# Patient Record
Sex: FEMALE | Race: BLACK OR AFRICAN AMERICAN | NOT HISPANIC OR LATINO | Employment: OTHER | URBAN - METROPOLITAN AREA
[De-identification: names, ages, dates, MRNs, and addresses within clinical notes are randomized per-mention and may not be internally consistent; named-entity substitution may affect disease eponyms.]

---

## 2019-10-29 LAB — CRC RECOMMENDATION EXT: NORMAL

## 2021-08-02 LAB — BCS RECOMMENDATION EXT: NORMAL

## 2022-06-07 ENCOUNTER — TELEPHONE (OUTPATIENT)
Dept: HEMATOLOGY/ONCOLOGY | Facility: CLINIC | Age: 57
End: 2022-06-07
Payer: MEDICARE

## 2022-06-07 NOTE — TELEPHONE ENCOUNTER
Patient said she was told to call the office if she ever started getting headaches. She states she has been getting them since Thursday. Tylenol gives her little to no relief last night she states she had to take 4 excedrin to get relief. During our conversation she remembered that she was started on a Prednisone taper dose from her Derm last week and thinks that may be contributing. She will be finished on Friday.    Rx is written for 30mg x 3 days then 20mg x 2 days then 10mg x 2 days  Please advise      [phone]

## 2022-07-08 ENCOUNTER — OFFICE VISIT (OUTPATIENT)
Dept: HEMATOLOGY/ONCOLOGY | Facility: CLINIC | Age: 57
End: 2022-07-08
Payer: MEDICARE

## 2022-07-08 VITALS
OXYGEN SATURATION: 98 % | WEIGHT: 190.38 LBS | DIASTOLIC BLOOD PRESSURE: 93 MMHG | BODY MASS INDEX: 35.03 KG/M2 | SYSTOLIC BLOOD PRESSURE: 151 MMHG | HEIGHT: 62 IN | HEART RATE: 68 BPM | RESPIRATION RATE: 18 BRPM

## 2022-07-08 DIAGNOSIS — D47.2 MONOCLONAL PARAPROTEINEMIA: ICD-10-CM

## 2022-07-08 PROCEDURE — 99214 OFFICE O/P EST MOD 30 MIN: CPT | Mod: ,,, | Performed by: STUDENT IN AN ORGANIZED HEALTH CARE EDUCATION/TRAINING PROGRAM

## 2022-07-08 PROCEDURE — 99214 PR OFFICE/OUTPT VISIT, EST, LEVL IV, 30-39 MIN: ICD-10-PCS | Mod: ,,, | Performed by: STUDENT IN AN ORGANIZED HEALTH CARE EDUCATION/TRAINING PROGRAM

## 2022-07-08 RX ORDER — CLOBETASOL PROPIONATE 0.5 MG/G
CREAM TOPICAL
COMMUNITY

## 2022-07-08 RX ORDER — ASPIRIN 81 MG/1
1 TABLET ORAL DAILY
COMMUNITY

## 2022-07-08 RX ORDER — LATANOPROST 50 UG/ML
SOLUTION/ DROPS OPHTHALMIC
COMMUNITY

## 2022-07-08 RX ORDER — MELOXICAM 15 MG/1
1 TABLET ORAL DAILY PRN
COMMUNITY
Start: 2021-07-19 | End: 2024-02-09

## 2022-07-08 RX ORDER — FOLIC ACID 1 MG/1
1 TABLET ORAL DAILY
COMMUNITY
Start: 2022-03-23

## 2022-07-08 RX ORDER — LEVOTHYROXINE SODIUM 125 UG/1
1 TABLET ORAL DAILY
COMMUNITY

## 2022-07-14 PROBLEM — D47.2 MONOCLONAL PARAPROTEINEMIA: Status: ACTIVE | Noted: 2022-07-14

## 2022-07-14 NOTE — PROGRESS NOTES
Chief complaint: Waldenstrom macroglobulinemia    HPI: 55 y/o F w/ PMHx of SLE, glaucoma, hypothyroidism, HLD, GERD, OA and Waldenstrom Macroglobulinemia previously being followed at [facility] by Dr Massey presented to [facility] to establish care.    She has never had a bone marrow biopsy or a lymph node biopsy. Diagnosed with SLE 17 years ago. Presenting symptoms were neuropathy. Diagnosed with WM , never had treatment    Colonoscopy , as per pt negative, Dr Boland. Follow up in 3 years    Interval History  Today, 2022, patient denies any acute concerns. She reports resolution of her headaches after finishing a prednisone taper.  She denies any new medications since.  She denies any symptoms of fevers, chills, drenching night sweats, decreased appetite or weight loss.    PMHx: unclear cardiac valve issue, SLE, waldenstrom macroglobulinemia, OA, GERD, glaucoma, hypothyroidism, HLD  PSHx: bunionectomy, tubal ligation, cyst removal  Social Hx: no tobacco, social ETOH, no drugs  Family Hx: sister: breast cancer father: prostate cancer  Meds: methotrexate, crestor, prednisone, sulfasalazine, diclofenac, synthroid  Allergies: hydrocodone, phenergan    Imagin21 screening MMG: BIRADS2  20 Screening MMG: BIRADS1  19: CT C/A/P: Slight increase of right axillary lymph nodes, the largest of which measures 8mm. Stable left axillary lymph nodes. Slight decrease in size of bilateral inguinal lymph nodes which remain normal by size criteria. No other significant changes.  2/15/19 CT C/A/P w/ IV contrast: borderline enlarged left axillary LN, slight increase in b/l inguinal LNs  18 CT C/A/P w/ IV contrast: small b/l axillary nodes, small borderline nodes in inguinal/pelvic areas  4/2/15 CT A/P w/ IV contrast: previously seen multiple prominent but non enlarged LNs along path of splenic artery and throughout splenic hilum have decreased in size and number since last exam. Previously seen mildly  enlarged LN in between upper pole of left kidney and spleen measuring at least exam 1.7x1.4cm significantly decreased in size now measuring 0.8x0.7cm    Labs:   06/27/2022:  Creatinine 0.76, albumin 3.6, calcium 9.6, , folic acid 9.2, vitamin B12 538, WBC count 3.57, hemoglobin 13.6, MCV 88.9, platelet count 237, INR 1.1, kappa free light chain to 31.86, lambda free light chain 8.93, kappa to lambda free light chain ratio 25.96, IgG 1529, SPEP/Pippa the with the M spike measuring 0.46 grams/deciliter in the beta region.  There is another M spike in the gamma region.  Collective monoclonal M spike measuring 2.05 grams/deciliter, immunofixation shows a palmar rising IgG type kappa and a palmar rising IgM type kappa biclonal protein.  UPEP is positive for monoclonal free kappa light chain, serum viscosity 1.48,    3/16/22 Cr 0.75, Alb 3.4, TP 8.2, , TSH 0.79, folic acid 12.27, WBC 4.05, Hgb 14.1, , ANC 2.31, PT 12.0, PTT 31, INR 1.0, IgG 1682, IgM 1266, IgA 63, SFLC ratio 28.90, kappa 215.63, lambda 7.46, collect M spike 2.13 g/dL IgG kappa and IgM kappa, UPEP  mg/day, Positive Bence Garcia protein. Urine PIPPA shows IgG heavy chain with associated kappa light chain excess monoclonal free kappa light chains, serum viscosity 1.46    1/5/22 Cr 0.78, Alb 3.5, TP 8.3, , TSH 11.3, folic acid 6.88, WBC 3.43, Hgb 13.9, , ANC 1.31, PT 12.1, INR 1.0, PTT 28, SFLC ratio 31.97, kappa 305.30, lambda 9.55, collect M spike 2.18 g/dL IgG kappa and IgM kappa    10/18/21 Cr 0.74 Alb 3.4 TP 8.2  TSH 0.22 Folate 9.19 WBC 4.23 Hg 13.6  INR 1.1, SFLC ratio 25.83, kappa 221, lambda 8.58 serum viscosity 1.67, collective m spike 2.28 IgG kappa and IgM kappa    7/12/21 serum viscosity 1.79 SPEP w/ PIPPA IgG 1946 IgA 57 IgM 1289 Abs kappa 229.66 Abs lambda 9.43 SFLC 24.35 1.32 g/dl, 0.96 g/dl IgG kappa IgM kappa 24hr UPEP 142 mg/day monoclonal free kappa and PIPPA with IgG heavy chain    4/19/21  serum viscosity 1.62 SPEP w/ PIPPA Igg 2115 IgM 1558 IgA 62 Abs kappa 273.9 Abs lambda 7.47 SFLC ratio 36.67 M spikes 2.55 g/dl biclonal IgG kappa and IgM kappa with additional band in IgM kappa INR 1.1 PT 12.8 PTT 28 Cr 0.75 Alb 3.4 TP 8.7  TSH 0.1031 folate 11.96 WBC 3.48 Hg 14  24hr Urine protein <156 mg/day    2/22/21 serum viscosity 1.75 SPEP w/ PIPPA IgG 1947 IgM 1250 IgA 56 Abs Kappa 219 Abs lambda 7.65 SFLC ratio 28.63 M spike 0.46 g/dl beta region, biclonal M spikes gamma region 1.31 g/dl and 1.05 g/dl IgG kappa and IgM kappa with additional faint band in IgG kappa, 24hr UPEP 168 mg/tera positive for Bence Garcia free kappa with PIPPA showing IgG kappa, excess free kappa and IgM kappa  Cr 0.70, Alb 3.3 TP 8.4 Ca 9.0  TSH 0.03 Folic Acid 6.09 WBC 3.87, Hgb 13.7, , ANC 2.01    11/26/20 Serum viscosity 1.51 SPEP w/ PIPPA IgG 1780 IgM 1620 IgA 66 Abs kappa 241.49 Abs lambda 7.67 SFLC ratio 31.49 M spikes 1.17 g/dl and 1.01 g/dl IgG kappa and IgM kappa with additional faint band in IgG kappa, 24hr UPEP Protein 172mg/day, Bence Garcia positive monoclonal free kappa, PIPPA with monoclonal IgG heavy chain with associated kappa light chain and excess free kappa light chains  Cr 0.79 TP 8.6 Alb 3.4 Ca 9.5 AlkPhos 68 AST 19 ALT 14  TSH 0.0498 WBC 5.11 Hg 13.4 RBC 4.66 MCV 91.2 RDW 12.9  ANC 2.9 INR 1.1 PT 12.7 PTT 28    9/29/20 Total protein in UPEP 138mg/day, positive for Bence Garcia free kappa. PIPPA IgG monoclonal heavy chain with associated kappa light chain. SPEP M spike 0.49 g/dl in beta region, M spikes in gamma region collectively account for 2.15 g/dl, PIPPA shows IgG type kappa and polymerizing IgM type kappa biclonal proteins with an additional faint band in IgG kappa, Serum viscosity 1.70. Abs kappa 194 Abs lambda 6.51 SFLC ratio 29.93. INR 1, PT 11.9, PTT 30. Cr 0.77 TP 8.2 Alb 3.4 Ca 8.  WBC 3.33 Hg 13.7     6/2/20 Total protein in UPEP 185mg/day, positive for Bence  Garcia free kappa. PIPPA IgG monoclonal heavy chain with associated kappa light chain. SPEP M spike 0.46 g/dl in beta region, M spikes in gamma region 2.15g/dl and 1.03 g/dl IgG kappa and polymerizing IgM kappa with additional faint band in IgG kappa. Serum viscosity 1.78. Abs kappa 185 Abs lambda 7.73 SFLC ratio 23.9. INR 1, PT 11.6. Cr 0.84 TP 8.4 Alb 3.5 Ca 9.01  WBC 3.62 Hg 13.3     2/13/20 SPEP w/ PIPPA IgA 82 IgM 1850 IgG 1680 M spike in beta region 0.52g/dl. M spikes in gamma region 1.08 g/dl and 1.04 g/dl IgG kappa and IgM kappa additional faint band IgG kappa serum viscosity 1.63  11/11/19 SFLC ratio 31.67, kappa 17.10, lambda 0.54, PIPPA shows IgM kappa and IgG kappa biclonal proteins serum viscosity 1.78    8/15/19 SIEP IgA 78, IgM 2310 (was 2430), IgG 1700 (was 1660, serum viscosity 1.81 ( was 1.69), CMP WNL except TP 8.8, WBC 3.14, HGB 13,7, HCT 41.9, MCV 89 PLT 211K, SKFLC 31/LFCL 0.77, K:L ratio 41  5/16/19: CBC and CMP unremarkable, coags WNL;     2/18/19 CBC and CMP WNL, LDH WNL, b2 microglobulin 2.5, serum viscosity 169, SPEP w/ M spikes in the gamma region 1.21 g/dl and 1.13 g/dl, suspicious broadening of the complement in the beta region. PIPPA pattern shows IgG type kappa and IgM type kappa biclonal proteins with an additional band in IgM kappa.    11/12/18 Hg 13.3 Kappa 10.8 lambda 0.3 SLFC ratio 29.19 Serum viscosity 1.8, , coags normal, b2 microglobulin 2.1, SPEP M spike in beta region 0.63 g/dl, 2 spikes in gamma region 1.23 g/dl and 1.14 g/dl IgG kappa and IgM kappa, UPEP +free kappa light chains and monoclonal IgG kappa    2/28/18 Hg 13.1 WBC 5.2   1/26/16 free kappa 23.3 ratio 28 viscosity 3.4, two M spikes 1g/dl and 0.9 g/dl IgM 2300  10/14/14 IgM 2496 SPEP M spike 2.2 g/dl    Path: none    Review of Systems     CONSTITUTIONAL: no fevers, no chills, no unintentional weight loss, + fatigue, no weakness  HEMATOLOGIC: no abnormal bleeding, no abnormal bruising, no  drenching night sweats  ONCOLOGIC: no new masses or lumps  HEENT: no vision loss or vision changes, no tinnitus or hearing loss, no nose bleeding, no dysphagia, no odynophagia  CVS: no chest pain, no palpitations, no dyspnea on exertion  RESP: no shortness of breath, no hemoptysis, no cough  BREAST: no nipple discharge, no breast tenderness, no breast masses on self breast examination  GI: no nausea, no vomiting, no diarrhea, no constipation, no melena, no hematochezia, no hematemesis, no abdominal pain, no increase in abdominal girth  : no dysuria, no hematuria, no discharge  GYN: no abnormal vaginal bleeding, no dyspareunia, no vaginal discharge  INTEGUMENT: no rashes, no abnormal bruising, no nail pitting, no hyperpigmentation  NEURO: no falls, no memory loss, no paresthesias or dysesthesias, no urofecal incontinence or retention, no loss of strength on any extremity  MSK: no back pain, no new joint pains but +shoulder pains, no joint swelling  PSYCH: no suicidal or homicidal ideation, no depression, no insomnia       Physical Exam   Vitals:    07/08/22 1006   BP: (!) 151/93   Pulse: 68   Resp: 18       ECOG PS 0  GA: AAOx3, NAD  HEENT: NCAT, PERRLA, EOMI, good dentition, no oral ulcers  LYMPH: no cervical, axillary, or supraclavicular adenopathy  CVS: s1s2 RRR, no M/R/G  RESP: CTA b/l, no crackles, no wheezes or rhonchi  ABD: soft, NT, ND, BS+, no hepatosplenomegaly  EXT: no deformities, no pedal edema  SKIN: no rashes, no bruises or purpura, warm and dry  NEURO: normal mentation, strength 5/5 on all 4 extremities, no sensory deficits    Assessment/Plan     1. Waldenstrom macroglobulinemia C88.0   She has biclonal IgM M spike and another IgG M spike, all kappa. Her serum viscosity is essentially normal. She has minimal to mild lymphadenopathy, without organomegaly, symptoms or any cytopenias. She has never had a bone marrow biopsy or a LN biopsy (she refuses). As no tissue is available, she does not meet  criteria for Waldenstrom's or IgM Myeloma or Amyloidosis or LPL or any other lymphoma for that matter.   She still does not want to do bone marrow biopsy despite extensive discussions today.  We will continue to pursue this in the future.  CT C/A/P on 11/19/19 showed Slight increase of right axillary lymph nodes, the largest of which measures 8mm. Stable left axillary lymph nodes. Slight decrease in size of bilateral inguinal lymph nodes which remain normal by size criteria. No other significant changes.  Imaging only as clinically indicated.  C/w folic acid 1mg daily  Reviewed labs, noted stable monoclonal paraproteinemia workup.  CBC, CMP, folic acid, LDH, coags, SPEP w/ PIPPA, UPEP, SFLC, Quant immunoglobulins, serum viscosity 1 week prior to next appointment  Return to clinic in 10 weeks with labs 1 week prior  Call if any questions or concerns     Ordered:   2. Hypothyroid E03.9 Most recent TSH WNL at 0.79, c/w Synthroid 125 mcg 6x/week as directed per PCP       A total of 30 minutes were spent in review of records, interpretation of test, coordination of care, discussion and counseling with the patient.

## 2022-09-22 PROBLEM — L93.0 LUPUS ERYTHEMATOSUS: Status: ACTIVE | Noted: 2021-03-08

## 2022-09-22 PROBLEM — C88.00 WALDENSTROM'S MACROGLOBULINEMIA: Status: ACTIVE | Noted: 2022-09-22

## 2022-09-22 PROBLEM — E78.00 HYPERCHOLESTEROLEMIA: Status: ACTIVE | Noted: 2021-03-08

## 2022-09-22 PROBLEM — C88.0 WALDENSTROM'S MACROGLOBULINEMIA: Status: ACTIVE | Noted: 2022-09-22

## 2022-09-22 PROBLEM — E03.9 HYPOTHYROIDISM: Status: ACTIVE | Noted: 2021-03-08

## 2022-09-22 PROBLEM — E66.9 OBESITY: Status: ACTIVE | Noted: 2022-09-22

## 2022-09-22 NOTE — PROGRESS NOTES
Chief complaint: Waldenstrom macroglobulinemia    HPI: 57 y/o F w/ PMHx of SLE, glaucoma, hypothyroidism, HLD, GERD, OA and Waldenstrom Macroglobulinemia previously being followed at [place] by Dr Massey presented to [facility] to establish care.    She has never had a bone marrow biopsy or a lymph node biopsy. Diagnosed with SLE 17 years ago. Presenting symptoms were neuropathy. Diagnosed with WM , never had treatment    Colonoscopy , as per pt negative, Dr Boland. Follow up in 3 years  Interval History  Today, 2022, patient denies any acute concerns. She is doing very well. Her only complaint is joint aches and pains. She sees Dr. Alonso in [place] for her RA. Currently not on any treatment. Her biggest complaint is pelvic pain. She plans on discussing this with him at her next appointment. She denies any new medications since.  She denies any symptoms of fevers, chills, drenching night sweats, decreased appetite or weight loss.    PMHx: unclear cardiac valve issue, SLE, waldenstrom macroglobulinemia, OA, GERD, glaucoma, hypothyroidism, HLD  PSHx: bunionectomy, tubal ligation, cyst removal  Social Hx: no tobacco, social ETOH, no drugs  Family Hx: sister: breast cancer father: prostate cancer  Meds: methotrexate, crestor, prednisone, sulfasalazine, diclofenac, synthroid  Allergies: hydrocodone, phenergan    Imagin21 screening MMG: BIRADS2  20 Screening MMG: BIRADS1  19: CT C/A/P: Slight increase of right axillary lymph nodes, the largest of which measures 8mm. Stable left axillary lymph nodes. Slight decrease in size of bilateral inguinal lymph nodes which remain normal by size criteria. No other significant changes.  2/15/19 CT C/A/P w/ IV contrast: borderline enlarged left axillary LN, slight increase in b/l inguinal LNs  18 CT C/A/P w/ IV contrast: small b/l axillary nodes, small borderline nodes in inguinal/pelvic areas  4/2/15 CT A/P w/ IV contrast: previously seen  multiple prominent but non enlarged LNs along path of splenic artery and throughout splenic hilum have decreased in size and number since last exam. Previously seen mildly enlarged LN in between upper pole of left kidney and spleen measuring at least exam 1.7x1.4cm significantly decreased in size now measuring 0.8x0.7cm    Labs:   09/26/22: CBC with WBC of 3.66 and ANC 1900, otherwise good. . Uric acid 7.3 (high). Kappa .62, Lambda LC 9.76, K/L ratio 23.12. Spep with M-spikes in the gamma region - 1.34 g/dL and 0.48 g/dL of the total 1.90 g/dL of the protein in the gamma region. Upep with faint band of IgG, Kappa LC 43.66, Lambda LC 3.41. Serum viscosity pending.     06/27/2022:  Creatinine 0.76, albumin 3.6, calcium 9.6, , folic acid 9.2, vitamin B12 538, WBC count 3.57, hemoglobin 13.6, MCV 88.9, platelet count 237, INR 1.1, kappa free light chain to 31.86, lambda free light chain 8.93, kappa to lambda free light chain ratio 25.96, IgG 1529, SPEP/Pippa the with the M spike measuring 0.46 grams/deciliter in the beta region.  There is another M spike in the gamma region.  Collective monoclonal M spike measuring 2.05 grams/deciliter, immunofixation shows a palmar rising IgG type kappa and a palmar rising IgM type kappa biclonal protein.  UPEP is positive for monoclonal free kappa light chain, serum viscosity 1.48,    3/16/22 Cr 0.75, Alb 3.4, TP 8.2, , TSH 0.79, folic acid 12.27, WBC 4.05, Hgb 14.1, , ANC 2.31, PT 12.0, PTT 31, INR 1.0, IgG 1682, IgM 1266, IgA 63, SFLC ratio 28.90, kappa 215.63, lambda 7.46, collect M spike 2.13 g/dL IgG kappa and IgM kappa, UPEP  mg/day, Positive Bence Garcia protein. Urine IPPPA shows IgG heavy chain with associated kappa light chain excess monoclonal free kappa light chains, serum viscosity 1.46    1/5/22 Cr 0.78, Alb 3.5, TP 8.3, , TSH 11.3, folic acid 6.88, WBC 3.43, Hgb 13.9, , ANC 1.31, PT 12.1, INR 1.0, PTT 28, SFLC ratio  31.97, kappa 305.30, lambda 9.55, collect M spike 2.18 g/dL IgG kappa and IgM kappa    10/18/21 Cr 0.74 Alb 3.4 TP 8.2  TSH 0.22 Folate 9.19 WBC 4.23 Hg 13.6  INR 1.1, SFLC ratio 25.83, kappa 221, lambda 8.58 serum viscosity 1.67, collective m spike 2.28 IgG kappa and IgM kappa    7/12/21 serum viscosity 1.79 SPEP w/ PIPPA IgG 1946 IgA 57 IgM 1289 Abs kappa 229.66 Abs lambda 9.43 SFLC 24.35 1.32 g/dl, 0.96 g/dl IgG kappa IgM kappa 24hr UPEP 142 mg/day monoclonal free kappa and PIPPA with IgG heavy chain    4/19/21 serum viscosity 1.62 SPEP w/ PIPPA Igg 2115 IgM 1558 IgA 62 Abs kappa 273.9 Abs lambda 7.47 SFLC ratio 36.67 M spikes 2.55 g/dl biclonal IgG kappa and IgM kappa with additional band in IgM kappa INR 1.1 PT 12.8 PTT 28 Cr 0.75 Alb 3.4 TP 8.7  TSH 0.1031 folate 11.96 WBC 3.48 Hg 14  24hr Urine protein <156 mg/day    2/22/21 serum viscosity 1.75 SPEP w/ PIPPA IgG 1947 IgM 1250 IgA 56 Abs Kappa 219 Abs lambda 7.65 SFLC ratio 28.63 M spike 0.46 g/dl beta region, biclonal M spikes gamma region 1.31 g/dl and 1.05 g/dl IgG kappa and IgM kappa with additional faint band in IgG kappa, 24hr UPEP 168 mg/tera positive for Bence Garcia free kappa with PIPPA showing IgG kappa, excess free kappa and IgM kappa  Cr 0.70, Alb 3.3 TP 8.4 Ca 9.0  TSH 0.03 Folic Acid 6.09 WBC 3.87, Hgb 13.7, , ANC 2.01    11/26/20 Serum viscosity 1.51 SPEP w/ PIPPA IgG 1780 IgM 1620 IgA 66 Abs kappa 241.49 Abs lambda 7.67 SFLC ratio 31.49 M spikes 1.17 g/dl and 1.01 g/dl IgG kappa and IgM kappa with additional faint band in IgG kappa, 24hr UPEP Protein 172mg/day, Bence Garcia positive monoclonal free kappa, PIPPA with monoclonal IgG heavy chain with associated kappa light chain and excess free kappa light chains  Cr 0.79 TP 8.6 Alb 3.4 Ca 9.5 AlkPhos 68 AST 19 ALT 14  TSH 0.0498 WBC 5.11 Hg 13.4 RBC 4.66 MCV 91.2 RDW 12.9  ANC 2.9 INR 1.1 PT 12.7 PTT 28    9/29/20 Total protein in UPEP 138mg/day, positive  for Bence Garcia free kappa. PIPPA IgG monoclonal heavy chain with associated kappa light chain. SPEP M spike 0.49 g/dl in beta region, M spikes in gamma region collectively account for 2.15 g/dl, PIPPA shows IgG type kappa and polymerizing IgM type kappa biclonal proteins with an additional faint band in IgG kappa, Serum viscosity 1.70. Abs kappa 194 Abs lambda 6.51 SFLC ratio 29.93. INR 1, PT 11.9, PTT 30. Cr 0.77 TP 8.2 Alb 3.4 Ca 8.  WBC 3.33 Hg 13.7     6/2/20 Total protein in UPEP 185mg/day, positive for Bence Garcia free kappa. PIPPA IgG monoclonal heavy chain with associated kappa light chain. SPEP M spike 0.46 g/dl in beta region, M spikes in gamma region 2.15g/dl and 1.03 g/dl IgG kappa and polymerizing IgM kappa with additional faint band in IgG kappa. Serum viscosity 1.78. Abs kappa 185 Abs lambda 7.73 SFLC ratio 23.9. INR 1, PT 11.6. Cr 0.84 TP 8.4 Alb 3.5 Ca 9.01  WBC 3.62 Hg 13.3     2/13/20 SPEP w/ PIPPA IgA 82 IgM 1850 IgG 1680 M spike in beta region 0.52g/dl. M spikes in gamma region 1.08 g/dl and 1.04 g/dl IgG kappa and IgM kappa additional faint band IgG kappa serum viscosity 1.63  11/11/19 SFLC ratio 31.67, kappa 17.10, lambda 0.54, PIPPA shows IgM kappa and IgG kappa biclonal proteins serum viscosity 1.78    8/15/19 SIEP IgA 78, IgM 2310 (was 2430), IgG 1700 (was 1660, serum viscosity 1.81 ( was 1.69), CMP WNL except TP 8.8, WBC 3.14, HGB 13,7, HCT 41.9, MCV 89 PLT 211K, SKFLC 31/LFCL 0.77, K:L ratio 41  5/16/19: CBC and CMP unremarkable, coags WNL;     2/18/19 CBC and CMP WNL, LDH WNL, b2 microglobulin 2.5, serum viscosity 169, SPEP w/ M spikes in the gamma region 1.21 g/dl and 1.13 g/dl, suspicious broadening of the complement in the beta region. PIPPA pattern shows IgG type kappa and IgM type kappa biclonal proteins with an additional band in IgM kappa.    11/12/18 Hg 13.3 Kappa 10.8 lambda 0.3 SLFC ratio 29.19 Serum viscosity 1.8, , coags normal, b2 microglobulin 2.1,  SPEP M spike in beta region 0.63 g/dl, 2 spikes in gamma region 1.23 g/dl and 1.14 g/dl IgG kappa and IgM kappa, UPEP +free kappa light chains and monoclonal IgG kappa    2/28/18 Hg 13.1 WBC 5.2   1/26/16 free kappa 23.3 ratio 28 viscosity 3.4, two M spikes 1g/dl and 0.9 g/dl IgM 2300  10/14/14 IgM 2496 SPEP M spike 2.2 g/dl    Path: none    Review of Systems     CONSTITUTIONAL: no fevers, no chills, no unintentional weight loss, + fatigue, no weakness  HEMATOLOGIC: no abnormal bleeding, no abnormal bruising, no drenching night sweats  ONCOLOGIC: no new masses or lumps  HEENT: no vision loss or vision changes, no tinnitus or hearing loss, no nose bleeding, no dysphagia, no odynophagia  CVS: no chest pain, no palpitations, no dyspnea on exertion  RESP: no shortness of breath, no hemoptysis, no cough  BREAST: no nipple discharge, no breast tenderness, no breast masses on self breast examination  GI: no nausea, no vomiting, no diarrhea, no constipation, no melena, no hematochezia, no hematemesis, no abdominal pain, no increase in abdominal girth  : no dysuria, no hematuria, no discharge  GYN: no abnormal vaginal bleeding, no dyspareunia, no vaginal discharge  INTEGUMENT: no rashes, no abnormal bruising, no nail pitting, no hyperpigmentation  NEURO: no falls, no memory loss, no paresthesias or dysesthesias, no urofecal incontinence or retention, no loss of strength on any extremity  MSK: no back pain, no new joint pains but +pelvic pains, no joint swelling  PSYCH: no suicidal or homicidal ideation, no depression, no insomnia       Physical Exam   Vitals:    09/30/22 1008   BP: (!) 146/82   Pulse: 71   Resp: 18   Temp: 97.9 °F (36.6 °C)     ECOG PS 0  GA: AAOx3, NAD  HEENT: NCAT, PERRLA, EOMI, good dentition, no oral ulcers  LYMPH: no cervical, axillary, or supraclavicular adenopathy. Not able to palpate any adenopathy today.  CVS: s1s2 RRR, no M/R/G  RESP: CTA b/l, no crackles, no wheezes or rhonchi  ABD: soft,  NT, ND, BS+, no hepatosplenomegaly  EXT: no deformities, no pedal edema  SKIN: no rashes, no bruises or purpura, warm and dry  NEURO: normal mentation, strength 5/5 on all 4 extremities, no sensory deficits    Assessment/Plan     1. Waldenstrom macroglobulinemia C88.0  She has biclonal IgM M spike and another IgG M spike, all kappa. Her serum viscosity is essentially normal. She has minimal to mild lymphadenopathy, without organomegaly, symptoms or any cytopenias. She has never had a bone marrow biopsy or a LN biopsy (she refuses). As no tissue is available, she does not meet criteria for Waldenstrom's or IgM Myeloma or Amyloidosis or LPL or any other lymphoma for that matter.   She still does not want to do bone marrow biopsy despite extensive discussions.  We will continue to pursue this in the future.  CT C/A/P on 11/19/19 showed Slight increase of right axillary lymph nodes, the largest of which measures 8mm. Stable left axillary lymph nodes. Slight decrease in size of bilateral inguinal lymph nodes which remain normal by size criteria. No other significant changes. Imaging only as clinically indicated.    Reviewed labs, noted stable monoclonal paraproteinemia workup.  CBC with stable WBC and ANC. Denies any recent or recurrent infections.   CMP and LDH normal. Uric acid level was obtained instead of folic acid and it was noted to be high at 7.3. She has chronic joint pains from her RA but denies any erythematous joints at present.   Spep with M-spikes in the gamma region - 1.34 g/dL and 0.48 g/dL of the total 1.90 g/dL of the protein in the gamma region. Upep with faint band of IgG, Kappa LC 43.66, Lambda LC 3.41. Serum viscosity pending.   Labs appear to be relatively stable at this time, will continue with observation per patient request.     Plan to repeat: SPEP w/ PIPPA, UPEP, SFLC, Quant immunoglobulins, serum viscosity 2 week prior to next appointment  C/w folic acid 1mg daily.  C/w Synthroid 125 mcg  6x/week as directed per PCP.  Return to clinic in 12 weeks with labs 2 weeks prior.  If her pelvic pain continues, consider skeletal survey.   Call if any questions or concerns     A total of 30 minutes were spent in review of records, interpretation of test, coordination of care, discussion and counseling with the patient.        THOMAS Humphries

## 2022-09-30 ENCOUNTER — OFFICE VISIT (OUTPATIENT)
Dept: HEMATOLOGY/ONCOLOGY | Facility: CLINIC | Age: 57
End: 2022-09-30
Payer: MEDICARE

## 2022-09-30 VITALS
DIASTOLIC BLOOD PRESSURE: 82 MMHG | RESPIRATION RATE: 18 BRPM | OXYGEN SATURATION: 100 % | BODY MASS INDEX: 34.56 KG/M2 | HEART RATE: 71 BPM | HEIGHT: 62 IN | SYSTOLIC BLOOD PRESSURE: 146 MMHG | TEMPERATURE: 98 F | WEIGHT: 187.81 LBS

## 2022-09-30 DIAGNOSIS — D47.2 MONOCLONAL PARAPROTEINEMIA: ICD-10-CM

## 2022-09-30 DIAGNOSIS — M32.9 SYSTEMIC LUPUS ERYTHEMATOSUS, UNSPECIFIED SLE TYPE, UNSPECIFIED ORGAN INVOLVEMENT STATUS: ICD-10-CM

## 2022-09-30 DIAGNOSIS — C88.0 WALDENSTROM'S MACROGLOBULINEMIA: Primary | ICD-10-CM

## 2022-09-30 PROBLEM — R76.8 POSITIVE ANTINUCLEAR ANTIBODY: Status: ACTIVE | Noted: 2022-09-30

## 2022-09-30 PROBLEM — R13.11 ORAL PHASE DYSPHAGIA: Status: ACTIVE | Noted: 2022-09-30

## 2022-09-30 PROBLEM — D89.2 HYPERGAMMAGLOBULINEMIA: Status: ACTIVE | Noted: 2022-09-30

## 2022-09-30 PROBLEM — I10 ESSENTIAL HYPERTENSION: Status: ACTIVE | Noted: 2022-09-30

## 2022-09-30 PROCEDURE — 99214 PR OFFICE/OUTPT VISIT, EST, LEVL IV, 30-39 MIN: ICD-10-PCS | Mod: ,,, | Performed by: NURSE PRACTITIONER

## 2022-09-30 PROCEDURE — 99214 OFFICE O/P EST MOD 30 MIN: CPT | Mod: ,,, | Performed by: NURSE PRACTITIONER

## 2022-12-03 ENCOUNTER — DOCUMENTATION ONLY (OUTPATIENT)
Dept: INTERNAL MEDICINE | Facility: CLINIC | Age: 57
End: 2022-12-03
Payer: MEDICARE

## 2023-01-06 ENCOUNTER — OFFICE VISIT (OUTPATIENT)
Dept: HEMATOLOGY/ONCOLOGY | Facility: CLINIC | Age: 58
End: 2023-01-06
Payer: MEDICARE

## 2023-01-06 VITALS
HEIGHT: 62 IN | RESPIRATION RATE: 18 BRPM | BODY MASS INDEX: 34.45 KG/M2 | OXYGEN SATURATION: 98 % | TEMPERATURE: 99 F | SYSTOLIC BLOOD PRESSURE: 144 MMHG | DIASTOLIC BLOOD PRESSURE: 83 MMHG | HEART RATE: 86 BPM | WEIGHT: 187.19 LBS

## 2023-01-06 DIAGNOSIS — C88.0 WALDENSTROM'S MACROGLOBULINEMIA: Primary | ICD-10-CM

## 2023-01-06 DIAGNOSIS — D47.2 MONOCLONAL PARAPROTEINEMIA: ICD-10-CM

## 2023-01-06 PROCEDURE — 99214 PR OFFICE/OUTPT VISIT, EST, LEVL IV, 30-39 MIN: ICD-10-PCS | Mod: ,,, | Performed by: STUDENT IN AN ORGANIZED HEALTH CARE EDUCATION/TRAINING PROGRAM

## 2023-01-06 PROCEDURE — 99214 OFFICE O/P EST MOD 30 MIN: CPT | Mod: ,,, | Performed by: STUDENT IN AN ORGANIZED HEALTH CARE EDUCATION/TRAINING PROGRAM

## 2023-01-06 RX ORDER — ROSUVASTATIN CALCIUM 40 MG/1
1 TABLET, COATED ORAL
COMMUNITY

## 2023-01-06 RX ORDER — LEVOTHYROXINE SODIUM 25 UG/1
TABLET ORAL
COMMUNITY
End: 2024-02-09

## 2023-01-06 NOTE — PROGRESS NOTES
Chief complaint: Waldenstrom macroglobulinemia    HPI: 55 y/o F w/ PMHx of SLE, glaucoma, hypothyroidism, HLD, GERD, OA and Waldenstrom Macroglobulinemia previously being followed at [place] by Dr Massey presented to [facility] to establish care.    She has never had a bone marrow biopsy or a lymph node biopsy. Diagnosed with SLE 17 years ago. Presenting symptoms were neuropathy. Diagnosed with WM , never had treatment    Colonoscopy , as per pt negative, Dr Boland. Follow up in 3 years    Interval History  Today, 2023, patient reports symptoms of worsening low back/pelvic pain since 2022.  She denies any new foci of pain.  She denies any symptoms of decreased appetite, weight loss, new lumps or bumps, drenching night sweats, fevers or chills.  She underwent a cholecystectomy with Dr. Elliott in 2022.    PMHx: unclear cardiac valve issue, SLE, waldenstrom macroglobulinemia, OA, GERD, glaucoma, hypothyroidism, HLD  PSHx: bunionectomy, tubal ligation, cyst removal  Social Hx: no tobacco, social ETOH, no drugs  Family Hx: sister: breast cancer father: prostate cancer  Meds: methotrexate, crestor, prednisone, sulfasalazine, diclofenac, synthroid  Allergies: hydrocodone, phenergan    Imagin21 screening MMG: BIRADS2  20 Screening MMG: BIRADS1  19: CT C/A/P: Slight increase of right axillary lymph nodes, the largest of which measures 8mm. Stable left axillary lymph nodes. Slight decrease in size of bilateral inguinal lymph nodes which remain normal by size criteria. No other significant changes.  2/15/19 CT C/A/P w/ IV contrast: borderline enlarged left axillary LN, slight increase in b/l inguinal LNs  18 CT C/A/P w/ IV contrast: small b/l axillary nodes, small borderline nodes in inguinal/pelvic areas  4/2/15 CT A/P w/ IV contrast: previously seen multiple prominent but non enlarged LNs along path of splenic artery and throughout splenic hilum have decreased in size and  number since last exam. Previously seen mildly enlarged LN in between upper pole of left kidney and spleen measuring at least exam 1.7x1.4cm significantly decreased in size now measuring 0.8x0.7cm    Labs:       12/20/2022:  Creatinine 0.75, albumin 3.6, calcium 9.1, alkaline phosphatase 68, total bili 1.1, AST 23, ALT 13, , folic acid 10.8, WBC count 3.2, hemoglobin 13.8, platelet count 233, ANC 1.63, beta 2 microglobulin 2.6, kappa free light chain 195.9, lambda free light chain 6.0, light chain ratio 32.65, serum viscosity 1.34, SPEP/Roma he with 1.56 grams/deciliter and 0.5 grams/deciliter of monoclonal protein, with IgM type kappa and IgG type kappa biclonal protein.    09/26/22: CBC with WBC of 3.66 and ANC 1900, otherwise good. . Uric acid 7.3 (high). Kappa .62, Lambda LC 9.76, K/L ratio 23.12. Spep with M-spikes in the gamma region - 1.34 g/dL and 0.48 g/dL of the total 1.90 g/dL of the protein in the gamma region. Upep with faint band of IgG, Kappa LC 43.66, Lambda LC 3.41. Serum viscosity pending.     06/27/2022:  Creatinine 0.76, albumin 3.6, calcium 9.6, , folic acid 9.2, vitamin B12 538, WBC count 3.57, hemoglobin 13.6, MCV 88.9, platelet count 237, INR 1.1, kappa free light chain to 31.86, lambda free light chain 8.93, kappa to lambda free light chain ratio 25.96, IgG 1529, SPEP/Roma the with the M spike measuring 0.46 grams/deciliter in the beta region.  There is another M spike in the gamma region.  Collective monoclonal M spike measuring 2.05 grams/deciliter, immunofixation shows a palmar rising IgG type kappa and a palmar rising IgM type kappa biclonal protein.  UPEP is positive for monoclonal free kappa light chain, serum viscosity 1.48,    3/16/22 Cr 0.75, Alb 3.4, TP 8.2, , TSH 0.79, folic acid 12.27, WBC 4.05, Hgb 14.1, , ANC 2.31, PT 12.0, PTT 31, INR 1.0, IgG 1682, IgM 1266, IgA 63, SFLC ratio 28.90, kappa 215.63, lambda 7.46, collect M spike 2.13 g/dL  IgG kappa and IgM kappa, UPEP  mg/day, Positive Bence Garcia protein. Urine PIPPA shows IgG heavy chain with associated kappa light chain excess monoclonal free kappa light chains, serum viscosity 1.46    1/5/22 Cr 0.78, Alb 3.5, TP 8.3, , TSH 11.3, folic acid 6.88, WBC 3.43, Hgb 13.9, , ANC 1.31, PT 12.1, INR 1.0, PTT 28, SFLC ratio 31.97, kappa 305.30, lambda 9.55, collect M spike 2.18 g/dL IgG kappa and IgM kappa    10/18/21 Cr 0.74 Alb 3.4 TP 8.2  TSH 0.22 Folate 9.19 WBC 4.23 Hg 13.6  INR 1.1, SFLC ratio 25.83, kappa 221, lambda 8.58 serum viscosity 1.67, collective m spike 2.28 IgG kappa and IgM kappa    7/12/21 serum viscosity 1.79 SPEP w/ PIPPA IgG 1946 IgA 57 IgM 1289 Abs kappa 229.66 Abs lambda 9.43 SFLC 24.35 1.32 g/dl, 0.96 g/dl IgG kappa IgM kappa 24hr UPEP 142 mg/day monoclonal free kappa and PIPPA with IgG heavy chain    4/19/21 serum viscosity 1.62 SPEP w/ PIPPA Igg 2115 IgM 1558 IgA 62 Abs kappa 273.9 Abs lambda 7.47 SFLC ratio 36.67 M spikes 2.55 g/dl biclonal IgG kappa and IgM kappa with additional band in IgM kappa INR 1.1 PT 12.8 PTT 28 Cr 0.75 Alb 3.4 TP 8.7  TSH 0.1031 folate 11.96 WBC 3.48 Hg 14  24hr Urine protein <156 mg/day    2/22/21 serum viscosity 1.75 SPEP w/ PIPPA IgG 1947 IgM 1250 IgA 56 Abs Kappa 219 Abs lambda 7.65 SFLC ratio 28.63 M spike 0.46 g/dl beta region, biclonal M spikes gamma region 1.31 g/dl and 1.05 g/dl IgG kappa and IgM kappa with additional faint band in IgG kappa, 24hr UPEP 168 mg/tera positive for Bence Garcia free kappa with PIPPA showing IgG kappa, excess free kappa and IgM kappa  Cr 0.70, Alb 3.3 TP 8.4 Ca 9.0  TSH 0.03 Folic Acid 6.09 WBC 3.87, Hgb 13.7, , ANC 2.01    11/26/20 Serum viscosity 1.51 SPEP w/ PIPPA IgG 1780 IgM 1620 IgA 66 Abs kappa 241.49 Abs lambda 7.67 SFLC ratio 31.49 M spikes 1.17 g/dl and 1.01 g/dl IgG kappa and IgM kappa with additional faint band in IgG kappa, 24hr UPEP Protein 172mg/day, Bence  Garcia positive monoclonal free kappa, PIPPA with monoclonal IgG heavy chain with associated kappa light chain and excess free kappa light chains  Cr 0.79 TP 8.6 Alb 3.4 Ca 9.5 AlkPhos 68 AST 19 ALT 14  TSH 0.0498 WBC 5.11 Hg 13.4 RBC 4.66 MCV 91.2 RDW 12.9  ANC 2.9 INR 1.1 PT 12.7 PTT 28    9/29/20 Total protein in UPEP 138mg/day, positive for Bence Garcia free kappa. PIPPA IgG monoclonal heavy chain with associated kappa light chain. SPEP M spike 0.49 g/dl in beta region, M spikes in gamma region collectively account for 2.15 g/dl, PIPPA shows IgG type kappa and polymerizing IgM type kappa biclonal proteins with an additional faint band in IgG kappa, Serum viscosity 1.70. Abs kappa 194 Abs lambda 6.51 SFLC ratio 29.93. INR 1, PT 11.9, PTT 30. Cr 0.77 TP 8.2 Alb 3.4 Ca 8.  WBC 3.33 Hg 13.7     6/2/20 Total protein in UPEP 185mg/day, positive for Bence Garcia free kappa. PIPPA IgG monoclonal heavy chain with associated kappa light chain. SPEP M spike 0.46 g/dl in beta region, M spikes in gamma region 2.15g/dl and 1.03 g/dl IgG kappa and polymerizing IgM kappa with additional faint band in IgG kappa. Serum viscosity 1.78. Abs kappa 185 Abs lambda 7.73 SFLC ratio 23.9. INR 1, PT 11.6. Cr 0.84 TP 8.4 Alb 3.5 Ca 9.01  WBC 3.62 Hg 13.3     2/13/20 SPEP w/ PIPPA IgA 82 IgM 1850 IgG 1680 M spike in beta region 0.52g/dl. M spikes in gamma region 1.08 g/dl and 1.04 g/dl IgG kappa and IgM kappa additional faint band IgG kappa serum viscosity 1.63  11/11/19 SFLC ratio 31.67, kappa 17.10, lambda 0.54, PIPPA shows IgM kappa and IgG kappa biclonal proteins serum viscosity 1.78    8/15/19 SIEP IgA 78, IgM 2310 (was 2430), IgG 1700 (was 1660, serum viscosity 1.81 ( was 1.69), CMP WNL except TP 8.8, WBC 3.14, HGB 13,7, HCT 41.9, MCV 89 PLT 211K, SKFLC 31/LFCL 0.77, K:L ratio 41  5/16/19: CBC and CMP unremarkable, coags WNL;     2/18/19 CBC and CMP WNL, LDH WNL, b2 microglobulin 2.5, serum viscosity 169,  SPEP w/ M spikes in the gamma region 1.21 g/dl and 1.13 g/dl, suspicious broadening of the complement in the beta region. PIPPA pattern shows IgG type kappa and IgM type kappa biclonal proteins with an additional band in IgM kappa.    11/12/18 Hg 13.3 Kappa 10.8 lambda 0.3 SLFC ratio 29.19 Serum viscosity 1.8, , coags normal, b2 microglobulin 2.1, SPEP M spike in beta region 0.63 g/dl, 2 spikes in gamma region 1.23 g/dl and 1.14 g/dl IgG kappa and IgM kappa, UPEP +free kappa light chains and monoclonal IgG kappa    2/28/18 Hg 13.1 WBC 5.2   1/26/16 free kappa 23.3 ratio 28 viscosity 3.4, two M spikes 1g/dl and 0.9 g/dl IgM 2300  10/14/14 IgM 2496 SPEP M spike 2.2 g/dl    Path: none    Review of Systems     CONSTITUTIONAL: no fevers, no chills, no unintentional weight loss, + fatigue, no weakness  HEMATOLOGIC: no abnormal bleeding, no abnormal bruising, no drenching night sweats  ONCOLOGIC: no new masses or lumps  HEENT: no vision loss or vision changes, no tinnitus or hearing loss, no nose bleeding, no dysphagia, no odynophagia  CVS: no chest pain, no palpitations, no dyspnea on exertion  RESP: no shortness of breath, no hemoptysis, no cough  BREAST: no nipple discharge, no breast tenderness, no breast masses on self breast examination  GI: no nausea, no vomiting, no diarrhea, no constipation, no melena, no hematochezia, no hematemesis, no abdominal pain, no increase in abdominal girth  : no dysuria, no hematuria, no discharge  GYN: no abnormal vaginal bleeding, no dyspareunia, no vaginal discharge  INTEGUMENT: no rashes, no abnormal bruising, no nail pitting, no hyperpigmentation  NEURO: no falls, no memory loss, no paresthesias or dysesthesias, no urofecal incontinence or retention, no loss of strength on any extremity  MSK: + back pain, no new joint pains but +pelvic pains, no joint swelling  PSYCH: no suicidal or homicidal ideation, no depression, no insomnia       Physical Exam   Vitals:     01/06/23 0944   BP: (!) 144/83   Pulse: 86   Resp: 18   Temp: 98.6 °F (37 °C)     ECOG PS 0  GA: AAOx3, NAD  HEENT: NCAT, PERRLA, EOMI, good dentition, no oral ulcers  LYMPH: no cervical, axillary, or supraclavicular adenopathy. Not able to palpate any adenopathy today.  CVS: s1s2 RRR, no M/R/G  RESP: CTA b/l, no crackles, no wheezes or rhonchi  ABD: soft, NT, ND, BS+, no hepatosplenomegaly  EXT: no deformities, no pedal edema  SKIN: no rashes, no bruises or purpura, warm and dry  NEURO: normal mentation, strength 5/5 on all 4 extremities, no sensory deficits    Assessment/Plan     1. Waldenstrom macroglobulinemia C88.0  She has biclonal IgM M spike and another IgG M spike, all kappa. Her serum viscosity is essentially normal. She has minimal to mild lymphadenopathy, without organomegaly, symptoms or any cytopenias. She has never had a bone marrow biopsy or a LN biopsy (she refuses). As no tissue is available, she does not meet criteria for Waldenstrom's or IgM Myeloma or Amyloidosis or LPL or any other lymphoma for that matter.   She still does not want to do bone marrow biopsy despite extensive discussions.  We will continue to pursue this in the future.  CT C/A/P on 11/19/19 showed Slight increase of right axillary lymph nodes, the largest of which measures 8mm. Stable left axillary lymph nodes. Slight decrease in size of bilateral inguinal lymph nodes which remain normal by size criteria. No other significant changes. Imaging only as clinically indicated.        Plan  Reviewed labs, noted stable monoclonal paraproteinemia workup.  Skeletal survey given symptoms of low back pain  Discuss bone marrow biopsy again today however patient seems hesitant to consider it.  We might have to consider bone marrow biopsy under sedation if skeletal survey is positive for lytic lesions.  Repeat myeloma panel in 1 month        A total of 30 minutes were spent in review of records, interpretation of test, coordination of care,  discussion and counseling with the patient.

## 2023-02-02 ENCOUNTER — DOCUMENTATION ONLY (OUTPATIENT)
Dept: ADMINISTRATIVE | Facility: HOSPITAL | Age: 58
End: 2023-02-02
Payer: MEDICARE

## 2023-02-08 NOTE — PROGRESS NOTES
Chief complaint: Waldenstrom macroglobulinemia    HPI: 55 y/o F w/ PMHx of SLE, glaucoma, hypothyroidism, HLD, GERD, OA and Waldenstrom Macroglobulinemia previously being followed at [place] by Dr Massey presented to [facility] to establish care.    She has never had a bone marrow biopsy or a lymph node biopsy. Diagnosed with SLE 17 years ago. Presenting symptoms were neuropathy. Diagnosed with WM , never had treatment    Colonoscopy , as per pt negative, Dr Boland. Follow up in 3 years    Interval History  Today, 2023, patient denies any acute concerns today.  She denies any new symptoms of pain.  She reports persistent low back pain which is unchanged from prior.  She denies any new medications, ER or hospital visits.  She denies any decreased appetite, weight loss.  She denies tingling numbness in her bilateral upper and lower extremities.    PMHx: unclear cardiac valve issue, SLE, waldenstrom macroglobulinemia, OA, GERD, glaucoma, hypothyroidism, HLD  PSHx: bunionectomy, tubal ligation, cyst removal  Social Hx: no tobacco, social ETOH, no drugs  Family Hx: sister: breast cancer father: prostate cancer  Meds: methotrexate, crestor, prednisone, sulfasalazine, diclofenac, synthroid  Allergies: hydrocodone, phenergan    Imagin2023 skeletal survey:  No suspicious lytic or sclerotic lesions.  Degenerative changes within the spine and bilateral knees.  21 screening MMG: BIRADS2  20 Screening MMG: BIRADS1  19: CT C/A/P: Slight increase of right axillary lymph nodes, the largest of which measures 8mm. Stable left axillary lymph nodes. Slight decrease in size of bilateral inguinal lymph nodes which remain normal by size criteria. No other significant changes.  2/15/19 CT C/A/P w/ IV contrast: borderline enlarged left axillary LN, slight increase in b/l inguinal LNs  18 CT C/A/P w/ IV contrast: small b/l axillary nodes, small borderline nodes in inguinal/pelvic areas  4/2/15 CT A/P  w/ IV contrast: previously seen multiple prominent but non enlarged LNs along path of splenic artery and throughout splenic hilum have decreased in size and number since last exam. Previously seen mildly enlarged LN in between upper pole of left kidney and spleen measuring at least exam 1.7x1.4cm significantly decreased in size now measuring 0.8x0.7cm    Labs:   02/01/2023:  CMP unremarkable, creatinine 0.8, calcium 9.3, WBC count 4.2, hemoglobin 13.6, MCV 90.1, platelet count 224, ANC 2.12, Beta-2 microglobulin 2.7, kappa light chain  240.5, lambda light chain 7.9, light chain ratio 30.21, serum viscosity 1.31.  SPEP/Roma he with mm spike of 0.43 grams/deciliter, Roma he with IgM kappa and IgG kappa biclonal spike.    12/20/2022:  Creatinine 0.75, albumin 3.6, calcium 9.1, alkaline phosphatase 68, total bili 1.1, AST 23, ALT 13, , folic acid 10.8, WBC count 3.2, hemoglobin 13.8, platelet count 233, ANC 1.63, beta 2 microglobulin 2.6, kappa free light chain 195.9, lambda free light chain 6.0, light chain ratio 32.65, serum viscosity 1.34, SPEP/Roma he with 1.56 grams/deciliter and 0.5 grams/deciliter of monoclonal protein, with IgM type kappa and IgG type kappa biclonal protein.    09/26/22: CBC with WBC of 3.66 and ANC 1900, otherwise good. . Uric acid 7.3 (high). Kappa .62, Lambda LC 9.76, K/L ratio 23.12. Spep with M-spikes in the gamma region - 1.34 g/dL and 0.48 g/dL of the total 1.90 g/dL of the protein in the gamma region. Upep with faint band of IgG, Kappa LC 43.66, Lambda LC 3.41. Serum viscosity pending.     06/27/2022:  Creatinine 0.76, albumin 3.6, calcium 9.6, , folic acid 9.2, vitamin B12 538, WBC count 3.57, hemoglobin 13.6, MCV 88.9, platelet count 237, INR 1.1, kappa free light chain to 31.86, lambda free light chain 8.93, kappa to lambda free light chain ratio 25.96, IgG 1529, SPEP/Roma the with the M spike measuring 0.46 grams/deciliter in the beta region.  There is another  M spike in the gamma region.  Collective monoclonal M spike measuring 2.05 grams/deciliter, immunofixation shows a palmar rising IgG type kappa and a palmar rising IgM type kappa biclonal protein.  UPEP is positive for monoclonal free kappa light chain, serum viscosity 1.48,    3/16/22 Cr 0.75, Alb 3.4, TP 8.2, , TSH 0.79, folic acid 12.27, WBC 4.05, Hgb 14.1, , ANC 2.31, PT 12.0, PTT 31, INR 1.0, IgG 1682, IgM 1266, IgA 63, SFLC ratio 28.90, kappa 215.63, lambda 7.46, collect M spike 2.13 g/dL IgG kappa and IgM kappa, UPEP  mg/day, Positive Bence Garcia protein. Urine PIPPA shows IgG heavy chain with associated kappa light chain excess monoclonal free kappa light chains, serum viscosity 1.46    1/5/22 Cr 0.78, Alb 3.5, TP 8.3, , TSH 11.3, folic acid 6.88, WBC 3.43, Hgb 13.9, , ANC 1.31, PT 12.1, INR 1.0, PTT 28, SFLC ratio 31.97, kappa 305.30, lambda 9.55, collect M spike 2.18 g/dL IgG kappa and IgM kappa    10/18/21 Cr 0.74 Alb 3.4 TP 8.2  TSH 0.22 Folate 9.19 WBC 4.23 Hg 13.6  INR 1.1, SFLC ratio 25.83, kappa 221, lambda 8.58 serum viscosity 1.67, collective m spike 2.28 IgG kappa and IgM kappa    7/12/21 serum viscosity 1.79 SPEP w/ PIPPA IgG 1946 IgA 57 IgM 1289 Abs kappa 229.66 Abs lambda 9.43 SFLC 24.35 1.32 g/dl, 0.96 g/dl IgG kappa IgM kappa 24hr UPEP 142 mg/day monoclonal free kappa and PIPPA with IgG heavy chain    4/19/21 serum viscosity 1.62 SPEP w/ PIPPA Igg 2115 IgM 1558 IgA 62 Abs kappa 273.9 Abs lambda 7.47 SFLC ratio 36.67 M spikes 2.55 g/dl biclonal IgG kappa and IgM kappa with additional band in IgM kappa INR 1.1 PT 12.8 PTT 28 Cr 0.75 Alb 3.4 TP 8.7  TSH 0.1031 folate 11.96 WBC 3.48 Hg 14  24hr Urine protein <156 mg/day    2/22/21 serum viscosity 1.75 SPEP w/ PIPPA IgG 1947 IgM 1250 IgA 56 Abs Kappa 219 Abs lambda 7.65 SFLC ratio 28.63 M spike 0.46 g/dl beta region, biclonal M spikes gamma region 1.31 g/dl and 1.05 g/dl IgG kappa and IgM kappa  with additional faint band in IgG kappa, 24hr UPEP 168 mg/tera positive for Bence Garcia free kappa with PIPPA showing IgG kappa, excess free kappa and IgM kappa  Cr 0.70, Alb 3.3 TP 8.4 Ca 9.0  TSH 0.03 Folic Acid 6.09 WBC 3.87, Hgb 13.7, , ANC 2.01    11/26/20 Serum viscosity 1.51 SPEP w/ PIPPA IgG 1780 IgM 1620 IgA 66 Abs kappa 241.49 Abs lambda 7.67 SFLC ratio 31.49 M spikes 1.17 g/dl and 1.01 g/dl IgG kappa and IgM kappa with additional faint band in IgG kappa, 24hr UPEP Protein 172mg/day, Bence Garcia positive monoclonal free kappa, PIPPA with monoclonal IgG heavy chain with associated kappa light chain and excess free kappa light chains  Cr 0.79 TP 8.6 Alb 3.4 Ca 9.5 AlkPhos 68 AST 19 ALT 14  TSH 0.0498 WBC 5.11 Hg 13.4 RBC 4.66 MCV 91.2 RDW 12.9  ANC 2.9 INR 1.1 PT 12.7 PTT 28    9/29/20 Total protein in UPEP 138mg/day, positive for Bence Garcia free kappa. PIPPA IgG monoclonal heavy chain with associated kappa light chain. SPEP M spike 0.49 g/dl in beta region, M spikes in gamma region collectively account for 2.15 g/dl, PIPPA shows IgG type kappa and polymerizing IgM type kappa biclonal proteins with an additional faint band in IgG kappa, Serum viscosity 1.70. Abs kappa 194 Abs lambda 6.51 SFLC ratio 29.93. INR 1, PT 11.9, PTT 30. Cr 0.77 TP 8.2 Alb 3.4 Ca 8.  WBC 3.33 Hg 13.7     6/2/20 Total protein in UPEP 185mg/day, positive for Bence Garcia free kappa. PIPPA IgG monoclonal heavy chain with associated kappa light chain. SPEP M spike 0.46 g/dl in beta region, M spikes in gamma region 2.15g/dl and 1.03 g/dl IgG kappa and polymerizing IgM kappa with additional faint band in IgG kappa. Serum viscosity 1.78. Abs kappa 185 Abs lambda 7.73 SFLC ratio 23.9. INR 1, PT 11.6. Cr 0.84 TP 8.4 Alb 3.5 Ca 9.01  WBC 3.62 Hg 13.3     2/13/20 SPEP w/ PIPPA IgA 82 IgM 1850 IgG 1680 M spike in beta region 0.52g/dl. M spikes in gamma region 1.08 g/dl and 1.04 g/dl IgG kappa and IgM  kappa additional faint band IgG kappa serum viscosity 1.63  11/11/19 SFLC ratio 31.67, kappa 17.10, lambda 0.54, PIPPA shows IgM kappa and IgG kappa biclonal proteins serum viscosity 1.78    8/15/19 SIEP IgA 78, IgM 2310 (was 2430), IgG 1700 (was 1660, serum viscosity 1.81 ( was 1.69), CMP WNL except TP 8.8, WBC 3.14, HGB 13,7, HCT 41.9, MCV 89 PLT 211K, SKFLC 31/LFCL 0.77, K:L ratio 41  5/16/19: CBC and CMP unremarkable, coags WNL;     2/18/19 CBC and CMP WNL, LDH WNL, b2 microglobulin 2.5, serum viscosity 169, SPEP w/ M spikes in the gamma region 1.21 g/dl and 1.13 g/dl, suspicious broadening of the complement in the beta region. PIPPA pattern shows IgG type kappa and IgM type kappa biclonal proteins with an additional band in IgM kappa.    11/12/18 Hg 13.3 Kappa 10.8 lambda 0.3 SLFC ratio 29.19 Serum viscosity 1.8, , coags normal, b2 microglobulin 2.1, SPEP M spike in beta region 0.63 g/dl, 2 spikes in gamma region 1.23 g/dl and 1.14 g/dl IgG kappa and IgM kappa, UPEP +free kappa light chains and monoclonal IgG kappa    2/28/18 Hg 13.1 WBC 5.2   1/26/16 free kappa 23.3 ratio 28 viscosity 3.4, two M spikes 1g/dl and 0.9 g/dl IgM 2300  10/14/14 IgM 2496 SPEP M spike 2.2 g/dl    Path: none    Review of Systems     CONSTITUTIONAL: no fevers, no chills, no unintentional weight loss, + fatigue, no weakness  HEMATOLOGIC: no abnormal bleeding, no abnormal bruising, no drenching night sweats  ONCOLOGIC: no new masses or lumps  HEENT: no vision loss or vision changes, no tinnitus or hearing loss, no nose bleeding, no dysphagia, no odynophagia  CVS: no chest pain, no palpitations, no dyspnea on exertion  RESP: no shortness of breath, no hemoptysis, no cough  BREAST: no nipple discharge, no breast tenderness, no breast masses on self breast examination  GI: no nausea, no vomiting, no diarrhea, no constipation, no melena, no hematochezia, no hematemesis, no abdominal pain, no increase in abdominal girth  : no  dysuria, no hematuria, no discharge  GYN: no abnormal vaginal bleeding, no dyspareunia, no vaginal discharge  INTEGUMENT: no rashes, no abnormal bruising, no nail pitting, no hyperpigmentation  NEURO: no falls, no memory loss, no paresthesias or dysesthesias, no urofecal incontinence or retention, no loss of strength on any extremity  MSK: + back pain, no new joint pains but +pelvic pains, no joint swelling  PSYCH: no suicidal or homicidal ideation, no depression, no insomnia       Physical Exam   Vitals:    02/09/23 1446   BP: (!) 167/84   Pulse: 69   Resp: 18   Temp: 98 °F (36.7 °C)     ECOG PS 0  GA: AAOx3, NAD  HEENT: NCAT, PERRLA, EOMI, good dentition, no oral ulcers  LYMPH: no cervical, axillary, or supraclavicular adenopathy. Not able to palpate any adenopathy today.  CVS: s1s2 RRR, no M/R/G  RESP: CTA b/l, no crackles, no wheezes or rhonchi  ABD: soft, NT, ND, BS+, no hepatosplenomegaly  EXT: no deformities, no pedal edema  SKIN: no rashes, no bruises or purpura, warm and dry  NEURO: normal mentation, strength 5/5 on all 4 extremities, no sensory deficits    Assessment/Plan     1. Waldenstrom macroglobulinemia C88.0  She has biclonal IgM M spike and another IgG M spike, all kappa. Her serum viscosity is essentially normal. She has minimal to mild lymphadenopathy, without organomegaly, symptoms or any cytopenias. She has never had a bone marrow biopsy or a LN biopsy (she refuses). As no tissue is available, she does not meet criteria for Waldenstrom's or IgM Myeloma or Amyloidosis or LPL or any other lymphoma for that matter.   She still does not want to do bone marrow biopsy despite extensive discussions.  We will continue to pursue this in the future.  CT C/A/P on 11/19/19 showed Slight increase of right axillary lymph nodes, the largest of which measures 8mm. Stable left axillary lymph nodes. Slight decrease in size of bilateral inguinal lymph nodes which remain normal by size criteria. No other  significant changes. Imaging only as clinically indicated.        Plan  Reviewed labs, noted stable monoclonal paraproteinemia workup.  Discuss bone marrow biopsy again today however patient seems hesitant to consider it.    Repeat myeloma panel 3 months .        A total of 30 minutes were spent in review of records, interpretation of test, coordination of care, discussion and counseling with the patient.

## 2023-02-09 ENCOUNTER — OFFICE VISIT (OUTPATIENT)
Dept: HEMATOLOGY/ONCOLOGY | Facility: CLINIC | Age: 58
End: 2023-02-09
Payer: MEDICARE

## 2023-02-09 VITALS
HEIGHT: 62 IN | DIASTOLIC BLOOD PRESSURE: 84 MMHG | RESPIRATION RATE: 18 BRPM | HEART RATE: 69 BPM | OXYGEN SATURATION: 97 % | WEIGHT: 186 LBS | BODY MASS INDEX: 34.23 KG/M2 | SYSTOLIC BLOOD PRESSURE: 167 MMHG | TEMPERATURE: 98 F

## 2023-02-09 DIAGNOSIS — C88.0 WALDENSTROM'S MACROGLOBULINEMIA: Primary | ICD-10-CM

## 2023-02-09 PROCEDURE — 99214 OFFICE O/P EST MOD 30 MIN: CPT | Mod: ,,, | Performed by: STUDENT IN AN ORGANIZED HEALTH CARE EDUCATION/TRAINING PROGRAM

## 2023-02-09 PROCEDURE — 99214 PR OFFICE/OUTPT VISIT, EST, LEVL IV, 30-39 MIN: ICD-10-PCS | Mod: ,,, | Performed by: STUDENT IN AN ORGANIZED HEALTH CARE EDUCATION/TRAINING PROGRAM

## 2023-04-11 ENCOUNTER — PATIENT MESSAGE (OUTPATIENT)
Dept: RESEARCH | Facility: HOSPITAL | Age: 58
End: 2023-04-11
Payer: MEDICARE

## 2023-05-09 ENCOUNTER — OFFICE VISIT (OUTPATIENT)
Dept: HEMATOLOGY/ONCOLOGY | Facility: CLINIC | Age: 58
End: 2023-05-09
Payer: MEDICARE

## 2023-05-09 VITALS
WEIGHT: 188.81 LBS | OXYGEN SATURATION: 99 % | HEART RATE: 65 BPM | TEMPERATURE: 98 F | SYSTOLIC BLOOD PRESSURE: 131 MMHG | DIASTOLIC BLOOD PRESSURE: 84 MMHG | RESPIRATION RATE: 20 BRPM | HEIGHT: 62 IN | BODY MASS INDEX: 34.74 KG/M2

## 2023-05-09 DIAGNOSIS — C88.0 WALDENSTROM'S MACROGLOBULINEMIA: Primary | ICD-10-CM

## 2023-05-09 PROCEDURE — 99214 OFFICE O/P EST MOD 30 MIN: CPT | Mod: ,,,

## 2023-05-09 PROCEDURE — 99214 PR OFFICE/OUTPT VISIT, EST, LEVL IV, 30-39 MIN: ICD-10-PCS | Mod: ,,,

## 2023-05-09 RX ORDER — BETAMETHASONE DIPROPIONATE 0.5 MG/G
0.05 CREAM TOPICAL DAILY
COMMUNITY
Start: 2023-03-16

## 2023-05-09 RX ORDER — CYCLOBENZAPRINE HCL 10 MG
10 TABLET ORAL DAILY
COMMUNITY

## 2023-05-09 NOTE — PROGRESS NOTES
Chief complaint: Waldenstrom macroglobulinemia    HPI: 55 y/o F w/ PMHx of SLE, glaucoma, hypothyroidism, HLD, GERD, OA and Waldenstrom Macroglobulinemia previously being followed at [place] by Dr Massey presented to [facility] to establish care.    She has never had a bone marrow biopsy or a lymph node biopsy. Diagnosed with SLE 17 years ago. Presenting symptoms were neuropathy. Diagnosed with WM , never had treatment    Colonoscopy , as per pt negative, Dr Boland. Follow up in 3 years    Interval History  Today, 2023, patient returns for follow-up. She reports pain in right hip related to RA. She has a f/u with rheumatology in 3 weeks.    She reports persistent low back pain which is unchanged from prior.  She denies any new medications, ER or hospital visits.  She denies any decreased appetite, weight loss, fever, or recurrent infections.  She denies tingling numbness in her bilateral upper and lower extremities.    PMHx: unclear cardiac valve issue, SLE, waldenstrom macroglobulinemia, OA, GERD, glaucoma, hypothyroidism, HLD  PSHx: bunionectomy, tubal ligation, cyst removal  Social Hx: no tobacco, social ETOH, no drugs  Family Hx: sister: breast cancer father: prostate cancer  Meds: methotrexate, crestor, prednisone, sulfasalazine, diclofenac, synthroid  Allergies: hydrocodone, phenergan    Imagin2023 skeletal survey:  No suspicious lytic or sclerotic lesions.  Degenerative changes within the spine and bilateral knees.  21 screening MMG: BIRADS2  20 Screening MMG: BIRADS1  19: CT C/A/P: Slight increase of right axillary lymph nodes, the largest of which measures 8mm. Stable left axillary lymph nodes. Slight decrease in size of bilateral inguinal lymph nodes which remain normal by size criteria. No other significant changes.  2/15/19 CT C/A/P w/ IV contrast: borderline enlarged left axillary LN, slight increase in b/l inguinal LNs  18 CT C/A/P w/ IV contrast: small b/l  axillary nodes, small borderline nodes in inguinal/pelvic areas  4/2/15 CT A/P w/ IV contrast: previously seen multiple prominent but non enlarged LNs along path of splenic artery and throughout splenic hilum have decreased in size and number since last exam. Previously seen mildly enlarged LN in between upper pole of left kidney and spleen measuring at least exam 1.7x1.4cm significantly decreased in size now measuring 0.8x0.7cm    Labs:   05/01/23: CMP unremarkable, creatinine 0.8, calcium 9.3, WBC count 3.81, hemoglobin 14.2, MCV 89.4, platelet count 241, ANC 2.02, Beta-2 microglobulin 2.6, kappa light chain  253.79, lambda light chain 8.10, light chain ratio 31.33, serum viscosity 1.29,  IgG 1675, IgM 993, IgA 60, SPEP/Pippa  with m spike of 0.42 grams/deciliter in beta region, Pippa with IgM kappa and IgG kappa biclonal spike. Upep positive for bence talbert proteins. PIPPA with monoclonal IgG heavy chain associated [place] light chain and excess monoclonal free kappa light chain. , UKappa LC 91.47, ULambda LC 4.73    02/01/2023:  CMP unremarkable, creatinine 0.8, calcium 9.3, WBC count 4.2, hemoglobin 13.6, MCV 90.1, platelet count 224, ANC 2.12, Beta-2 microglobulin 2.7, kappa light chain  240.5, lambda light chain 7.9, light chain ratio 30.21, serum viscosity 1.31.  SPEP/Pippa he with mm spike of 0.43 grams/deciliter, Pippa he with IgM kappa and IgG kappa biclonal spike.    12/20/2022:  Creatinine 0.75, albumin 3.6, calcium 9.1, alkaline phosphatase 68, total bili 1.1, AST 23, ALT 13, , folic acid 10.8, WBC count 3.2, hemoglobin 13.8, platelet count 233, ANC 1.63, beta 2 microglobulin 2.6, kappa free light chain 195.9, lambda free light chain 6.0, light chain ratio 32.65, serum viscosity 1.34, SPEP/Pippa he with 1.56 grams/deciliter and 0.5 grams/deciliter of monoclonal protein, with IgM type kappa and IgG type kappa biclonal protein.    09/26/22: CBC with WBC of 3.66 and ANC 1900, otherwise good. . Uric acid  7.3 (high). [place] .62, Lambda LC 9.76, K/L ratio 23.12. Spep with M-spikes in the gamma region - 1.34 g/dL and 0.48 g/dL of the total 1.90 g/dL of the protein in the gamma region. Upep with faint band of IgG, Kappa LC 43.66, Lambda LC 3.41. Serum viscosity pending.     06/27/2022:  Creatinine 0.76, albumin 3.6, calcium 9.6, , folic acid 9.2, vitamin B12 538, WBC count 3.57, hemoglobin 13.6, MCV 88.9, platelet count 237, INR 1.1, kappa free light chain to 31.86, lambda free light chain 8.93, kappa to lambda free light chain ratio 25.96, IgG 1529, SPEP/Pippa the with the M spike measuring 0.46 grams/deciliter in the beta region.  There is another M spike in the gamma region.  Collective monoclonal M spike measuring 2.05 grams/deciliter, immunofixation shows a palmar rising IgG type kappa and a palmar rising IgM type kappa biclonal protein.  UPEP is positive for monoclonal free kappa light chain, serum viscosity 1.48,    3/16/22 Cr 0.75, Alb 3.4, TP 8.2, , TSH 0.79, folic acid 12.27, WBC 4.05, Hgb 14.1, , ANC 2.31, PT 12.0, PTT 31, INR 1.0, IgG 1682, IgM 1266, IgA 63, SFLC ratio 28.90, kappa 215.63, lambda 7.46, collect M spike 2.13 g/dL IgG kappa and IgM kappa, UPEP  mg/day, Positive Bence Garcia protein. Urine PIPPA shows IgG heavy chain with associated kappa light chain excess monoclonal free kappa light chains, serum viscosity 1.46    1/5/22 Cr 0.78, Alb 3.5, TP 8.3, , TSH 11.3, folic acid 6.88, WBC 3.43, Hgb 13.9, , ANC 1.31, PT 12.1, INR 1.0, PTT 28, SFLC ratio 31.97, kappa 305.30, lambda 9.55, collect M spike 2.18 g/dL IgG kappa and IgM kappa    10/18/21 Cr 0.74 Alb 3.4 TP 8.2  TSH 0.22 Folate 9.19 WBC 4.23 Hg 13.6  INR 1.1, SFLC ratio 25.83, kappa 221, lambda 8.58 serum viscosity 1.67, collective m spike 2.28 IgG kappa and IgM kappa    7/12/21 serum viscosity 1.79 SPEP w/ PIPPA IgG 1946 IgA 57 IgM 1289 Abs kappa 229.66 Abs lambda 9.43 SFLC 24.35 1.32 g/dl,  0.96 g/dl IgG kappa IgM kappa 24hr UPEP 142 mg/day monoclonal free kappa and PIPPA with IgG heavy chain    4/19/21 serum viscosity 1.62 SPEP w/ PIPPA Igg 2115 IgM 1558 IgA 62 Abs kappa 273.9 Abs lambda 7.47 SFLC ratio 36.67 M spikes 2.55 g/dl biclonal IgG kappa and IgM kappa with additional band in IgM kappa INR 1.1 PT 12.8 PTT 28 Cr 0.75 Alb 3.4 TP 8.7  TSH 0.1031 folate 11.96 WBC 3.48 Hg 14  24hr Urine protein <156 mg/day    2/22/21 serum viscosity 1.75 SPEP w/ PIPPA IgG 1947 IgM 1250 IgA 56 Abs Kappa 219 Abs lambda 7.65 SFLC ratio 28.63 M spike 0.46 g/dl beta region, biclonal M spikes gamma region 1.31 g/dl and 1.05 g/dl IgG kappa and IgM kappa with additional faint band in IgG kappa, 24hr UPEP 168 mg/tera positive for Bence Garcia free kappa with PIPPA showing IgG kappa, excess free kappa and IgM kappa  Cr 0.70, Alb 3.3 TP 8.4 Ca 9.0  TSH 0.03 Folic Acid 6.09 WBC 3.87, Hgb 13.7, , ANC 2.01    11/26/20 Serum viscosity 1.51 SPEP w/ PIPPA IgG 1780 IgM 1620 IgA 66 Abs kappa 241.49 Abs lambda 7.67 SFLC ratio 31.49 M spikes 1.17 g/dl and 1.01 g/dl IgG kappa and IgM kappa with additional faint band in IgG kappa, 24hr UPEP Protein 172mg/day, Bence Garcia positive monoclonal free kappa, PIPPA with monoclonal IgG heavy chain with associated kappa light chain and excess free kappa light chains  Cr 0.79 TP 8.6 Alb 3.4 Ca 9.5 AlkPhos 68 AST 19 ALT 14  TSH 0.0498 WBC 5.11 Hg 13.4 RBC 4.66 MCV 91.2 RDW 12.9  ANC 2.9 INR 1.1 PT 12.7 PTT 28    9/29/20 Total protein in UPEP 138mg/day, positive for Bence Garcia free kappa. PIPPA IgG monoclonal heavy chain with associated kappa light chain. SPEP M spike 0.49 g/dl in beta region, M spikes in gamma region collectively account for 2.15 g/dl, PIPPA shows IgG type kappa and polymerizing IgM type kappa biclonal proteins with an additional faint band in IgG kappa, Serum viscosity 1.70. Abs kappa 194 Abs lambda 6.51 SFLC ratio 29.93. INR 1, PT 11.9, PTT 30. Cr 0.77 TP  8.2 Alb 3.4 Ca 8.  WBC 3.33 Hg 13.7     6/2/20 Total protein in UPEP 185mg/day, positive for Bence Garcia free kappa. PIPPA IgG monoclonal heavy chain with associated kappa light chain. SPEP M spike 0.46 g/dl in beta region, M spikes in gamma region 2.15g/dl and 1.03 g/dl IgG kappa and polymerizing IgM kappa with additional faint band in IgG kappa. Serum viscosity 1.78. Abs kappa 185 Abs lambda 7.73 SFLC ratio 23.9. INR 1, PT 11.6. Cr 0.84 TP 8.4 Alb 3.5 Ca 9.01  WBC 3.62 Hg 13.3     2/13/20 SPEP w/ PIPPA IgA 82 IgM 1850 IgG 1680 M spike in beta region 0.52g/dl. M spikes in gamma region 1.08 g/dl and 1.04 g/dl IgG kappa and IgM kappa additional faint band IgG kappa serum viscosity 1.63  11/11/19 SFLC ratio 31.67, kappa 17.10, lambda 0.54, PIPPA shows IgM kappa and IgG kappa biclonal proteins serum viscosity 1.78    8/15/19 SIEP IgA 78, IgM 2310 (was 2430), IgG 1700 (was 1660, serum viscosity 1.81 ( was 1.69), CMP WNL except TP 8.8, WBC 3.14, HGB 13,7, HCT 41.9, MCV 89 PLT 211K, SKFLC 31/LFCL 0.77, K:L ratio 41  5/16/19: CBC and CMP unremarkable, coags WNL;     2/18/19 CBC and CMP WNL, LDH WNL, b2 microglobulin 2.5, serum viscosity 169, SPEP w/ M spikes in the gamma region 1.21 g/dl and 1.13 g/dl, suspicious broadening of the complement in the beta region. PIPPA pattern shows IgG type kappa and IgM type kappa biclonal proteins with an additional band in IgM kappa.    11/12/18 Hg 13.3 Kappa 10.8 lambda 0.3 SLFC ratio 29.19 Serum viscosity 1.8, , coags normal, b2 microglobulin 2.1, SPEP M spike in beta region 0.63 g/dl, 2 spikes in gamma region 1.23 g/dl and 1.14 g/dl IgG kappa and IgM kappa, UPEP +free kappa light chains and monoclonal IgG kappa    2/28/18 Hg 13.1 WBC 5.2   1/26/16 free kappa 23.3 ratio 28 viscosity 3.4, two M spikes 1g/dl and 0.9 g/dl IgM 2300  10/14/14 IgM 2496 SPEP M spike 2.2 g/dl    Path: none    Review of Systems     CONSTITUTIONAL: no fevers, no chills, no  unintentional weight loss, + fatigue, no weakness  HEMATOLOGIC: no abnormal bleeding, no abnormal bruising, no drenching night sweats  ONCOLOGIC: no new masses or lumps  HEENT: no vision loss or vision changes, no tinnitus or hearing loss, no nose bleeding, no dysphagia, no odynophagia  CVS: no chest pain, no palpitations, no dyspnea on exertion  RESP: no shortness of breath, no hemoptysis, no cough  BREAST: no nipple discharge, no breast tenderness, no breast masses on self breast examination  GI: no nausea, no vomiting, no diarrhea, no constipation, no melena, no hematochezia, no hematemesis, no abdominal pain, no increase in abdominal girth  : no dysuria, no hematuria, no discharge  GYN: no abnormal vaginal bleeding, no dyspareunia, no vaginal discharge  INTEGUMENT: no rashes, no abnormal bruising, no nail pitting, no hyperpigmentation  NEURO: no falls, no memory loss, no paresthesias or dysesthesias, no urofecal incontinence or retention, no loss of strength on any extremity  MSK: + back pain, no new joint pains but +pelvic pains, no joint swelling  PSYCH: no suicidal or homicidal ideation, no depression, no insomnia       Physical Exam   There were no vitals filed for this visit.    ECOG PS 0  GA: AAOx3, NAD  HEENT: NCAT, PERRLA, EOMI, good dentition, no oral ulcers  LYMPH: no cervical, axillary, or supraclavicular adenopathy. Not able to palpate any adenopathy today.  CVS: s1s2 RRR, no M/R/G  RESP: CTA b/l, no crackles, no wheezes or rhonchi  ABD: soft, NT, ND, BS+, no hepatosplenomegaly  EXT: no deformities, no pedal edema  SKIN: no rashes, no bruises or purpura, warm and dry  NEURO: normal mentation, strength 5/5 on all 4 extremities, no sensory deficits    Assessment/Plan     1. Waldenstrom macroglobulinemia C88.0  She has biclonal IgM M spike and another IgG M spike, all kappa. Her serum viscosity is essentially normal. She has minimal to mild lymphadenopathy, without organomegaly, symptoms or any  cytopenias. She has never had a bone marrow biopsy or a LN biopsy (she refuses). As no tissue is available, she does not meet criteria for Waldenstrom's or IgM Myeloma or Amyloidosis or LPL or any other lymphoma for that matter.   She still does not want to do bone marrow biopsy despite extensive discussions.  We will continue to pursue this in the future.  CT C/A/P on 11/19/19 showed Slight increase of right axillary lymph nodes, the largest of which measures 8mm. Stable left axillary lymph nodes. Slight decrease in size of bilateral inguinal lymph nodes which remain normal by size criteria. No other significant changes. Imaging only as clinically indicated.        Plan  Reviewed labs, noted stable monoclonal paraproteinemia workup.  Discuss bone marrow biopsy again today. She again declines to pursue.  Repeat myeloma panel 3 months .        A total of 30 minutes were spent in review of records, interpretation of test, coordination of care, discussion and counseling with the patient.

## 2023-08-09 ENCOUNTER — OFFICE VISIT (OUTPATIENT)
Dept: HEMATOLOGY/ONCOLOGY | Facility: CLINIC | Age: 58
End: 2023-08-09
Payer: MEDICARE

## 2023-08-09 VITALS
WEIGHT: 185.31 LBS | OXYGEN SATURATION: 98 % | SYSTOLIC BLOOD PRESSURE: 166 MMHG | HEART RATE: 56 BPM | BODY MASS INDEX: 34.1 KG/M2 | TEMPERATURE: 98 F | HEIGHT: 62 IN | RESPIRATION RATE: 18 BRPM | DIASTOLIC BLOOD PRESSURE: 78 MMHG

## 2023-08-09 DIAGNOSIS — C88.0 WALDENSTROM'S MACROGLOBULINEMIA: Primary | ICD-10-CM

## 2023-08-09 PROCEDURE — 99214 PR OFFICE/OUTPT VISIT, EST, LEVL IV, 30-39 MIN: ICD-10-PCS | Mod: ,,, | Performed by: STUDENT IN AN ORGANIZED HEALTH CARE EDUCATION/TRAINING PROGRAM

## 2023-08-09 PROCEDURE — 99214 OFFICE O/P EST MOD 30 MIN: CPT | Mod: ,,, | Performed by: STUDENT IN AN ORGANIZED HEALTH CARE EDUCATION/TRAINING PROGRAM

## 2023-08-09 NOTE — PROGRESS NOTES
Chief complaint: Waldenstrom macroglobulinemia    HPI: 55 y/o F w/ PMHx of SLE, glaucoma, hypothyroidism, HLD, GERD, OA and Waldenstrom Macroglobulinemia previously being followed at [place] by Dr Massey presented to [facility] to establish care.    She has never had a bone marrow biopsy or a lymph node biopsy. Diagnosed with SLE 17 years ago. Presenting symptoms were neuropathy. Diagnosed with WM , never had treatment    Colonoscopy , as per pt negative, Dr Boland. Follow up in 3 years    Interval History  Today, 2023, patient reports symptoms of pelvic pain for which she had a CT scan done at an ER which was unremarkable per patient report.  She denies any other acute concerns.  She denies new foci of pain.  She does suffer from chronic arthritis which is unchanged from prior.  She denies any new medications, ER or hospital visits.  She denies any headaches, vision changes or focal weakness.      Imagin2023 skeletal survey:  No suspicious lytic or sclerotic lesions.  Degenerative changes within the spine and bilateral knees.  21 screening MMG: BIRADS2  20 Screening MMG: BIRADS1  19: CT C/A/P: Slight increase of right axillary lymph nodes, the largest of which measures 8mm. Stable left axillary lymph nodes. Slight decrease in size of bilateral inguinal lymph nodes which remain normal by size criteria. No other significant changes.  2/15/19 CT C/A/P w/ IV contrast: borderline enlarged left axillary LN, slight increase in b/l inguinal LNs  18 CT C/A/P w/ IV contrast: small b/l axillary nodes, small borderline nodes in inguinal/pelvic areas  4/2/15 CT A/P w/ IV contrast: previously seen multiple prominent but non enlarged LNs along path of splenic artery and throughout splenic hilum have decreased in size and number since last exam. Previously seen mildly enlarged LN in between upper pole of left kidney and spleen measuring at least exam 1.7x1.4cm significantly decreased in  size now measuring 0.8x0.7cm    Labs:     07/21/2023 creatinine 0.7, albumin 3.5, calcium 9.0, LFTs within normal limits, ,WBC count 3.82, hemoglobin 13.2, MCV 88.5, platelet count 238, ANC 2.07, Beta-2 microglobulin 2.5, kappa light chain 259, lambda light chain 7, light chain ratio 37.10, urine immunofixation with IgG monoclonal protein kappa light chain, urine kappa to lambda ratio at 12.08, 2 M spikes measuring 1.4 and 0.3, IgM without no confirmed light chain association.      05/01/23: CMP unremarkable, creatinine 0.8, calcium 9.3, WBC count 3.81, hemoglobin 14.2, MCV 89.4, platelet count 241, ANC 2.02, Beta-2 microglobulin 2.6, kappa light chain  253.79, lambda light chain 8.10, light chain ratio 31.33, serum viscosity 1.29,  IgG 1675, IgM 993, IgA 60, SPEP/Pippa  with m spike of 0.42 grams/deciliter in beta region, Pippa with IgM kappa and IgG kappa biclonal spike. Upep positive for bence talbert proteins. PIPPA with monoclonal IgG heavy chain associated [place] light chain and excess monoclonal free kappa light chain. , UKappa LC 91.47, ULambda LC 4.73    02/01/2023:  CMP unremarkable, creatinine 0.8, calcium 9.3, WBC count 4.2, hemoglobin 13.6, MCV 90.1, platelet count 224, ANC 2.12, Beta-2 microglobulin 2.7, kappa light chain  240.5, lambda light chain 7.9, light chain ratio 30.21, serum viscosity 1.31.  SPEP/Pippa he with mm spike of 0.43 grams/deciliter, Pippa he with IgM kappa and IgG kappa biclonal spike.    12/20/2022:  Creatinine 0.75, albumin 3.6, calcium 9.1, alkaline phosphatase 68, total bili 1.1, AST 23, ALT 13, , folic acid 10.8, WBC count 3.2, hemoglobin 13.8, platelet count 233, ANC 1.63, beta 2 microglobulin 2.6, kappa free light chain 195.9, lambda free light chain 6.0, light chain ratio 32.65, serum viscosity 1.34, SPEP/Pippa he with 1.56 grams/deciliter and 0.5 grams/deciliter of monoclonal protein, with IgM type kappa and IgG type kappa biclonal protein.    09/26/22: CBC with WBC of 3.66  and ANC 1900, otherwise good. . Uric acid 7.3 (high). Kappa .62, Lambda LC 9.76, K/L ratio 23.12. Spep with M-spikes in the gamma region - 1.34 g/dL and 0.48 g/dL of the total 1.90 g/dL of the protein in the gamma region. Upep with faint band of IgG, Kappa LC 43.66, Lambda LC 3.41. Serum viscosity pending.     06/27/2022:  Creatinine 0.76, albumin 3.6, calcium 9.6, , folic acid 9.2, vitamin B12 538, WBC count 3.57, hemoglobin 13.6, MCV 88.9, platelet count 237, INR 1.1, kappa free light chain to 31.86, lambda free light chain 8.93, kappa to lambda free light chain ratio 25.96, IgG 1529, SPEP/Pippa the with the M spike measuring 0.46 grams/deciliter in the beta region.  There is another M spike in the gamma region.  Collective monoclonal M spike measuring 2.05 grams/deciliter, immunofixation shows a palmar rising IgG type kappa and a palmar rising IgM type kappa biclonal protein.  UPEP is positive for monoclonal free kappa light chain, serum viscosity 1.48,    3/16/22 Cr 0.75, Alb 3.4, TP 8.2, , TSH 0.79, folic acid 12.27, WBC 4.05, Hgb 14.1, , ANC 2.31, PT 12.0, PTT 31, INR 1.0, IgG 1682, IgM 1266, IgA 63, SFLC ratio 28.90, kappa 215.63, lambda 7.46, collect M spike 2.13 g/dL IgG kappa and IgM kappa, UPEP  mg/day, Positive Bence Garcia protein. Urine PIPPA shows IgG heavy chain with associated kappa light chain excess monoclonal free kappa light chains, serum viscosity 1.46    1/5/22 Cr 0.78, Alb 3.5, TP 8.3, , TSH 11.3, folic acid 6.88, WBC 3.43, Hgb 13.9, , ANC 1.31, PT 12.1, INR 1.0, PTT 28, SFLC ratio 31.97, kappa 305.30, lambda 9.55, collect M spike 2.18 g/dL IgG kappa and IgM kappa    10/18/21 Cr 0.74 Alb 3.4 TP 8.2  TSH 0.22 Folate 9.19 WBC 4.23 Hg 13.6  INR 1.1, SFLC ratio 25.83, kappa 221, lambda 8.58 serum viscosity 1.67, collective m spike 2.28 IgG kappa and IgM kappa    7/12/21 serum viscosity 1.79 SPEP w/ PIPPA IgG 1946 IgA 57 IgM 1289 Abs  kappa 229.66 Abs lambda 9.43 SFLC 24.35 1.32 g/dl, 0.96 g/dl IgG kappa IgM kappa 24hr UPEP 142 mg/day monoclonal free kappa and PIPPA with IgG heavy chain    4/19/21 serum viscosity 1.62 SPEP w/ PIPPA Igg 2115 IgM 1558 IgA 62 Abs kappa 273.9 Abs lambda 7.47 SFLC ratio 36.67 M spikes 2.55 g/dl biclonal IgG kappa and IgM kappa with additional band in IgM kappa INR 1.1 PT 12.8 PTT 28 Cr 0.75 Alb 3.4 TP 8.7  TSH 0.1031 folate 11.96 WBC 3.48 Hg 14  24hr Urine protein <156 mg/day    2/22/21 serum viscosity 1.75 SPEP w/ PIPPA IgG 1947 IgM 1250 IgA 56 Abs Kappa 219 Abs lambda 7.65 SFLC ratio 28.63 M spike 0.46 g/dl beta region, biclonal M spikes gamma region 1.31 g/dl and 1.05 g/dl IgG kappa and IgM kappa with additional faint band in IgG kappa, 24hr UPEP 168 mg/tera positive for Bence Garcia free kappa with PIPPA showing IgG kappa, excess free kappa and IgM kappa  Cr 0.70, Alb 3.3 TP 8.4 Ca 9.0  TSH 0.03 Folic Acid 6.09 WBC 3.87, Hgb 13.7, , ANC 2.01    11/26/20 Serum viscosity 1.51 SPEP w/ PIPPA IgG 1780 IgM 1620 IgA 66 Abs kappa 241.49 Abs lambda 7.67 SFLC ratio 31.49 M spikes 1.17 g/dl and 1.01 g/dl IgG kappa and IgM kappa with additional faint band in IgG kappa, 24hr UPEP Protein 172mg/day, Bence Garcia positive monoclonal free kappa, PIPPA with monoclonal IgG heavy chain with associated kappa light chain and excess free kappa light chains  Cr 0.79 TP 8.6 Alb 3.4 Ca 9.5 AlkPhos 68 AST 19 ALT 14  TSH 0.0498 WBC 5.11 Hg 13.4 RBC 4.66 MCV 91.2 RDW 12.9  ANC 2.9 INR 1.1 PT 12.7 PTT 28    9/29/20 Total protein in UPEP 138mg/day, positive for Bence Garcia free kappa. PIPPA IgG monoclonal heavy chain with associated kappa light chain. SPEP M spike 0.49 g/dl in beta region, M spikes in gamma region collectively account for 2.15 g/dl, PIPPA shows IgG type kappa and polymerizing IgM type kappa biclonal proteins with an additional faint band in IgG kappa, Serum viscosity 1.70. Abs kappa 194 Abs lambda 6.51  SFLC ratio 29.93. INR 1, PT 11.9, PTT 30. Cr 0.77 TP 8.2 Alb 3.4 Ca 8.  WBC 3.33 Hg 13.7     6/2/20 Total protein in UPEP 185mg/day, positive for Bence Garcia free kappa. PIPPA IgG monoclonal heavy chain with associated kappa light chain. SPEP M spike 0.46 g/dl in beta region, M spikes in gamma region 2.15g/dl and 1.03 g/dl IgG kappa and polymerizing IgM kappa with additional faint band in IgG kappa. Serum viscosity 1.78. Abs kappa 185 Abs lambda 7.73 SFLC ratio 23.9. INR 1, PT 11.6. Cr 0.84 TP 8.4 Alb 3.5 Ca 9.01  WBC 3.62 Hg 13.3     2/13/20 SPEP w/ PIPPA IgA 82 IgM 1850 IgG 1680 M spike in beta region 0.52g/dl. M spikes in gamma region 1.08 g/dl and 1.04 g/dl IgG kappa and IgM kappa additional faint band IgG kappa serum viscosity 1.63  11/11/19 SFLC ratio 31.67, kappa 17.10, lambda 0.54, PIPPA shows IgM kappa and IgG kappa biclonal proteins serum viscosity 1.78    8/15/19 SIEP IgA 78, IgM 2310 (was 2430), IgG 1700 (was 1660, serum viscosity 1.81 ( was 1.69), CMP WNL except TP 8.8, WBC 3.14, HGB 13,7, HCT 41.9, MCV 89 PLT 211K, SKFLC 31/LFCL 0.77, K:L ratio 41  5/16/19: CBC and CMP unremarkable, coags WNL;     2/18/19 CBC and CMP WNL, LDH WNL, b2 microglobulin 2.5, serum viscosity 169, SPEP w/ M spikes in the gamma region 1.21 g/dl and 1.13 g/dl, suspicious broadening of the complement in the beta region. PIPPA pattern shows IgG type kappa and IgM type kappa biclonal proteins with an additional band in IgM kappa.    11/12/18 Hg 13.3 Kappa 10.8 lambda 0.3 SLFC ratio 29.19 Serum viscosity 1.8, , coags normal, b2 microglobulin 2.1, SPEP M spike in beta region 0.63 g/dl, 2 spikes in gamma region 1.23 g/dl and 1.14 g/dl IgG kappa and IgM kappa, UPEP +free kappa light chains and monoclonal IgG kappa    2/28/18 Hg 13.1 WBC 5.2   1/26/16 free kappa 23.3 ratio 28 viscosity 3.4, two M spikes 1g/dl and 0.9 g/dl IgM 2300  10/14/14 IgM 2496 SPEP M spike 2.2 g/dl    Path: none        Past  Medical History:   Diagnosis Date    Glaucoma     Lupus     Personal history of colonic polyps 10/29/2019    Thyroid disease        Past Surgical History:   Procedure Laterality Date    BILATERAL TUBAL LIGATION      BREAST SURGERY  1986    Left breast ( cyst)    BUNIONECTOMY      COLONOSCOPY W/ BIOPSIES AND POLYPECTOMY  10/29/2019    TUBAL LIGATION  1993       Family History   Problem Relation Age of Onset    Prostate cancer Father     Cancer Father     Breast cancer Sister     Cancer Sister     Heart disease Brother     Cancer Brother     Prostate cancer Brother     Arthritis Mother        Social History     Socioeconomic History    Marital status:    Tobacco Use    Smoking status: Never    Smokeless tobacco: Never   Substance and Sexual Activity    Alcohol use: Never    Drug use: Never    Sexual activity: Yes     Partners: Male     Birth control/protection: None       Current Outpatient Medications   Medication Sig Dispense Refill    aspirin (ECOTRIN) 81 MG EC tablet Take 1 tablet by mouth once daily.      augmented betamethasone dipropionate (DIPROLENE-AF) 0.05 % cream Apply 0.05 m topically Daily.      clobetasoL (TEMOVATE) 0.05 % cream clobetasol 0.05 % topical cream      cyclobenzaprine (FLEXERIL) 10 MG tablet Take 10 mg by mouth Daily.      folic acid (FOLVITE) 1 MG tablet Take 1 tablet by mouth once daily.      latanoprost 0.005 % ophthalmic solution latanoprost 0.005 % eye drops      meloxicam (MOBIC) 15 MG tablet Take 1 tablet by mouth daily as needed.      rosuvastatin (CRESTOR) 40 MG Tab 1 tablet.      levothyroxine (SYNTHROID) 125 MCG tablet Take 1 tablet by mouth once daily.      levothyroxine (SYNTHROID) 25 MCG tablet 1 tablet in the morning on an empty stomach       No current facility-administered medications for this visit.       Review of patient's allergies indicates:   Allergen Reactions    Hydrocodone      Other reaction(s): .  Other reaction(s): irritability    Promethazine Other (See  Comments)     Other reaction(s): jittery           Review of Systems     CONSTITUTIONAL: no fevers, no chills, no unintentional weight loss, + fatigue, no weakness  HEMATOLOGIC: no abnormal bleeding, no abnormal bruising, no drenching night sweats  ONCOLOGIC: no new masses or lumps  HEENT: no vision loss or vision changes, no tinnitus or hearing loss, no nose bleeding, no dysphagia, no odynophagia  CVS: no chest pain, no palpitations, no dyspnea on exertion  RESP: no shortness of breath, no hemoptysis, no cough  BREAST: no nipple discharge, no breast tenderness, no breast masses on self breast examination  GI: no nausea, no vomiting, no diarrhea, no constipation, no melena, no hematochezia, no hematemesis, no abdominal pain, no increase in abdominal girth  : no dysuria, no hematuria, no discharge  GYN: no abnormal vaginal bleeding, no dyspareunia, no vaginal discharge  INTEGUMENT: no rashes, no abnormal bruising, no nail pitting, no hyperpigmentation  NEURO: no falls, no memory loss, no paresthesias or dysesthesias, no urofecal incontinence or retention, no loss of strength on any extremity  MSK: + back pain, no new joint pains but +pelvic pains, no joint swelling  PSYCH: no suicidal or homicidal ideation, no depression, no insomnia       Physical Exam   Vitals:    08/09/23 0850   BP: (!) 166/78   Pulse: (!) 56   Resp: 18   Temp: 98 °F (36.7 °C)       ECOG PS 0  GA: AAOx3, NAD  HEENT: NCAT, PERRLA, EOMI, good dentition, no oral ulcers  LYMPH: no cervical, axillary, or supraclavicular adenopathy. Not able to palpate any adenopathy today.  CVS: s1s2 RRR, no M/R/G  RESP: CTA b/l, no crackles, no wheezes or rhonchi  ABD: soft, NT, ND, BS+, no hepatosplenomegaly  EXT: no deformities, no pedal edema  SKIN: no rashes, no bruises or purpura, warm and dry  NEURO: normal mentation, strength 5/5 on all 4 extremities, no sensory deficits    Assessment/Plan     1. Waldenstrom macroglobulinemia C88.0  She has biclonal IgM M  spike and another IgG M spike, all kappa. Her serum viscosity is essentially normal. She has minimal to mild lymphadenopathy, without organomegaly, symptoms or any cytopenias. She has never had a bone marrow biopsy or a LN biopsy (she refuses). As no tissue is available, she does not meet criteria for Waldenstrom's or IgM Myeloma or Amyloidosis or LPL or any other lymphoma for that matter.   She still does not want to do bone marrow biopsy despite extensive discussions.  We will continue to pursue this in the future.  CT C/A/P on 11/19/19 showed Slight increase of right axillary lymph nodes, the largest of which measures 8mm. Stable left axillary lymph nodes. Slight decrease in size of bilateral inguinal lymph nodes which remain normal by size criteria. No other significant changes. Imaging only as clinically indicated.        Plan  Reviewed labs, noted increase in IgM levels, as well as in other M spike measuring 1.4 grams/deciliter.  Discussed the need for a bone marrow biopsy for diagnosis and treatment recommendations.  Patient would like to think about this further.  Plan to follow-up in clinic in 3 months, with repeat labs including myeloma panel.        A total of 30 minutes were spent in review of records, interpretation of test, coordination of care, discussion and counseling with the patient.

## 2023-11-09 ENCOUNTER — OFFICE VISIT (OUTPATIENT)
Dept: HEMATOLOGY/ONCOLOGY | Facility: CLINIC | Age: 58
End: 2023-11-09
Payer: MEDICARE

## 2023-11-09 VITALS
RESPIRATION RATE: 18 BRPM | BODY MASS INDEX: 35.31 KG/M2 | OXYGEN SATURATION: 97 % | HEART RATE: 67 BPM | SYSTOLIC BLOOD PRESSURE: 150 MMHG | HEIGHT: 62 IN | TEMPERATURE: 98 F | DIASTOLIC BLOOD PRESSURE: 89 MMHG | WEIGHT: 191.88 LBS

## 2023-11-09 DIAGNOSIS — C88.0 WALDENSTROM'S MACROGLOBULINEMIA: Primary | ICD-10-CM

## 2023-11-09 PROCEDURE — 99214 OFFICE O/P EST MOD 30 MIN: CPT | Mod: ,,, | Performed by: STUDENT IN AN ORGANIZED HEALTH CARE EDUCATION/TRAINING PROGRAM

## 2023-11-09 PROCEDURE — 99214 PR OFFICE/OUTPT VISIT, EST, LEVL IV, 30-39 MIN: ICD-10-PCS | Mod: ,,, | Performed by: STUDENT IN AN ORGANIZED HEALTH CARE EDUCATION/TRAINING PROGRAM

## 2023-11-09 NOTE — PROGRESS NOTES
Chief complaint: Waldenstrom macroglobulinemia    HPI: 55 y/o F w/ PMHx of SLE, glaucoma, hypothyroidism, HLD, GERD, OA and Waldenstrom Macroglobulinemia previously being followed at [place] by Dr Massey presented to [facility] to establish care.    She has never had a bone marrow biopsy or a lymph node biopsy. Diagnosed with SLE 17 years ago. Presenting symptoms were neuropathy. Diagnosed with WM , never had treatment    Colonoscopy , as per pt negative, Dr Boland. Follow up in 3 years    Interval History  Today, 2023, patient reports intermittent headaches which resolve with Tylenol.  She denies any blurry or double vision.  She denies any tingling numbness.  She denies any decreased appetite or weight loss.  She denies new medications, ER or hospital visits since last follow-up with us.    Imagin2023 skeletal survey:  No suspicious lytic or sclerotic lesions.  Degenerative changes within the spine and bilateral knees.  21 screening MMG: BIRADS2  20 Screening MMG: BIRADS1  19: CT C/A/P: Slight increase of right axillary lymph nodes, the largest of which measures 8mm. Stable left axillary lymph nodes. Slight decrease in size of bilateral inguinal lymph nodes which remain normal by size criteria. No other significant changes.  2/15/19 CT C/A/P w/ IV contrast: borderline enlarged left axillary LN, slight increase in b/l inguinal LNs  18 CT C/A/P w/ IV contrast: small b/l axillary nodes, small borderline nodes in inguinal/pelvic areas  4/2/15 CT A/P w/ IV contrast: previously seen multiple prominent but non enlarged LNs along path of splenic artery and throughout splenic hilum have decreased in size and number since last exam. Previously seen mildly enlarged LN in between upper pole of left kidney and spleen measuring at least exam 1.7x1.4cm significantly decreased in size now measuring 0.8x0.7cm    Labs:     10/30/2023 CMP unremarkable, , WBC count 3.04, hemoglobin  13.6, MCV 89, platelet count 207, Beta-2 microglobulin 3.2, IgG 1602, IgA 57, IgM 1235, 2 M spikes measuring 1.2 grams/deciliter and 0.3 grams/deciliter, immunofixation shows biclonal IgG kappa protein.  Kappa light chain 244.3, lambda light chain 9.1, kappa to lambda ratio 26.8, serum viscosity 1.8.    07/21/2023 creatinine 0.7, albumin 3.5, calcium 9.0, LFTs within normal limits, ,WBC count 3.82, hemoglobin 13.2, MCV 88.5, platelet count 238, ANC 2.07, Beta-2 microglobulin 2.5, kappa light chain 259, lambda light chain 7, light chain ratio 37.10, urine immunofixation with IgG monoclonal protein kappa light chain, urine kappa to lambda ratio at 12.08, 2 M spikes measuring 1.4 and 0.3, IgM without no confirmed light chain association.      05/01/23: CMP unremarkable, creatinine 0.8, calcium 9.3, WBC count 3.81, hemoglobin 14.2, MCV 89.4, platelet count 241, ANC 2.02, Beta-2 microglobulin 2.6, kappa light chain  253.79, lambda light chain 8.10, light chain ratio 31.33, serum viscosity 1.29,  IgG 1675, IgM 993, IgA 60, SPEP/Pippa  with m spike of 0.42 grams/deciliter in beta region, Pippa with IgM kappa and IgG kappa biclonal spike. Upep positive for bence talbert proteins. PIPPA with monoclonal IgG heavy chain associated [place] light chain and excess monoclonal free kappa light chain. , UKappa LC 91.47, ULambda LC 4.73    02/01/2023:  CMP unremarkable, creatinine 0.8, calcium 9.3, WBC count 4.2, hemoglobin 13.6, MCV 90.1, platelet count 224, ANC 2.12, Beta-2 microglobulin 2.7, kappa light chain  240.5, lambda light chain 7.9, light chain ratio 30.21, serum viscosity 1.31.  SPEP/Pippa he with mm spike of 0.43 grams/deciliter, Pippa he with IgM kappa and IgG kappa biclonal spike.    12/20/2022:  Creatinine 0.75, albumin 3.6, calcium 9.1, alkaline phosphatase 68, total bili 1.1, AST 23, ALT 13, , folic acid 10.8, WBC count 3.2, hemoglobin 13.8, platelet count 233, ANC 1.63, beta 2 microglobulin 2.6, kappa free light  chain 195.9, lambda free light chain 6.0, light chain ratio 32.65, serum viscosity 1.34, SPEP/Pippa he with 1.56 grams/deciliter and 0.5 grams/deciliter of monoclonal protein, with IgM type kappa and IgG type kappa biclonal protein.    09/26/22: CBC with WBC of 3.66 and ANC 1900, otherwise good. . Uric acid 7.3 (high). Kappa .62, Lambda LC 9.76, K/L ratio 23.12. Spep with M-spikes in the gamma region - 1.34 g/dL and 0.48 g/dL of the total 1.90 g/dL of the protein in the gamma region. Upep with faint band of IgG, Kappa LC 43.66, Lambda LC 3.41. Serum viscosity pending.     06/27/2022:  Creatinine 0.76, albumin 3.6, calcium 9.6, , folic acid 9.2, vitamin B12 538, WBC count 3.57, hemoglobin 13.6, MCV 88.9, platelet count 237, INR 1.1, kappa free light chain to 31.86, lambda free light chain 8.93, kappa to lambda free light chain ratio 25.96, IgG 1529, SPEP/Pippa the with the M spike measuring 0.46 grams/deciliter in the beta region.  There is another M spike in the gamma region.  Collective monoclonal M spike measuring 2.05 grams/deciliter, immunofixation shows a palmar rising IgG type kappa and a palmar rising IgM type kappa biclonal protein.  UPEP is positive for monoclonal free kappa light chain, serum viscosity 1.48,    3/16/22 Cr 0.75, Alb 3.4, TP 8.2, , TSH 0.79, folic acid 12.27, WBC 4.05, Hgb 14.1, , ANC 2.31, PT 12.0, PTT 31, INR 1.0, IgG 1682, IgM 1266, IgA 63, SFLC ratio 28.90, kappa 215.63, lambda 7.46, collect M spike 2.13 g/dL IgG kappa and IgM kappa, UPEP  mg/day, Positive Bence Garcia protein. Urine PIPPA shows IgG heavy chain with associated kappa light chain excess monoclonal free kappa light chains, serum viscosity 1.46    1/5/22 Cr 0.78, Alb 3.5, TP 8.3, , TSH 11.3, folic acid 6.88, WBC 3.43, Hgb 13.9, , ANC 1.31, PT 12.1, INR 1.0, PTT 28, SFLC ratio 31.97, kappa 305.30, lambda 9.55, collect M spike 2.18 g/dL IgG kappa and IgM kappa    10/18/21 Cr 0.74  Alb 3.4 TP 8.2  TSH 0.22 Folate 9.19 WBC 4.23 Hg 13.6  INR 1.1, SFLC ratio 25.83, kappa 221, lambda 8.58 serum viscosity 1.67, collective m spike 2.28 IgG kappa and IgM kappa    7/12/21 serum viscosity 1.79 SPEP w/ PIPPA IgG 1946 IgA 57 IgM 1289 Abs kappa 229.66 Abs lambda 9.43 SFLC 24.35 1.32 g/dl, 0.96 g/dl IgG kappa IgM kappa 24hr UPEP 142 mg/day monoclonal free kappa and PIPPA with IgG heavy chain    4/19/21 serum viscosity 1.62 SPEP w/ PIPPA Igg 2115 IgM 1558 IgA 62 Abs kappa 273.9 Abs lambda 7.47 SFLC ratio 36.67 M spikes 2.55 g/dl biclonal IgG kappa and IgM kappa with additional band in IgM kappa INR 1.1 PT 12.8 PTT 28 Cr 0.75 Alb 3.4 TP 8.7  TSH 0.1031 folate 11.96 WBC 3.48 Hg 14  24hr Urine protein <156 mg/day    2/22/21 serum viscosity 1.75 SPEP w/ PIPPA IgG 1947 IgM 1250 IgA 56 Abs Kappa 219 Abs lambda 7.65 SFLC ratio 28.63 M spike 0.46 g/dl beta region, biclonal M spikes gamma region 1.31 g/dl and 1.05 g/dl IgG kappa and IgM kappa with additional faint band in IgG kappa, 24hr UPEP 168 mg/tera positive for Bence Garcia free kappa with PIPPA showing IgG kappa, excess free kappa and IgM kappa  Cr 0.70, Alb 3.3 TP 8.4 Ca 9.0  TSH 0.03 Folic Acid 6.09 WBC 3.87, Hgb 13.7, , ANC 2.01    11/26/20 Serum viscosity 1.51 SPEP w/ PIPPA IgG 1780 IgM 1620 IgA 66 Abs kappa 241.49 Abs lambda 7.67 SFLC ratio 31.49 M spikes 1.17 g/dl and 1.01 g/dl IgG kappa and IgM kappa with additional faint band in IgG kappa, 24hr UPEP Protein 172mg/day, Bence Garcia positive monoclonal free kappa, PIPPA with monoclonal IgG heavy chain with associated kappa light chain and excess free kappa light chains  Cr 0.79 TP 8.6 Alb 3.4 Ca 9.5 AlkPhos 68 AST 19 ALT 14  TSH 0.0498 WBC 5.11 Hg 13.4 RBC 4.66 MCV 91.2 RDW 12.9  ANC 2.9 INR 1.1 PT 12.7 PTT 28    9/29/20 Total protein in UPEP 138mg/day, positive for Bence Garcia free kappa. PIPPA IgG monoclonal heavy chain with associated kappa light chain. SPEP M  spike 0.49 g/dl in beta region, M spikes in gamma region collectively account for 2.15 g/dl, PIPPA shows IgG type kappa and polymerizing IgM type kappa biclonal proteins with an additional faint band in IgG kappa, Serum viscosity 1.70. Abs kappa 194 Abs lambda 6.51 SFLC ratio 29.93. INR 1, PT 11.9, PTT 30. Cr 0.77 TP 8.2 Alb 3.4 Ca 8.  WBC 3.33 Hg 13.7     6/2/20 Total protein in UPEP 185mg/day, positive for Bence Garcia free kappa. PIPPA IgG monoclonal heavy chain with associated kappa light chain. SPEP M spike 0.46 g/dl in beta region, M spikes in gamma region 2.15g/dl and 1.03 g/dl IgG kappa and polymerizing IgM kappa with additional faint band in IgG kappa. Serum viscosity 1.78. Abs kappa 185 Abs lambda 7.73 SFLC ratio 23.9. INR 1, PT 11.6. Cr 0.84 TP 8.4 Alb 3.5 Ca 9.01  WBC 3.62 Hg 13.3     2/13/20 SPEP w/ PIPPA IgA 82 IgM 1850 IgG 1680 M spike in beta region 0.52g/dl. M spikes in gamma region 1.08 g/dl and 1.04 g/dl IgG kappa and IgM kappa additional faint band IgG kappa serum viscosity 1.63  11/11/19 SFLC ratio 31.67, kappa 17.10, lambda 0.54, PIPPA shows IgM kappa and IgG kappa biclonal proteins serum viscosity 1.78    8/15/19 SIEP IgA 78, IgM 2310 (was 2430), IgG 1700 (was 1660, serum viscosity 1.81 ( was 1.69), CMP WNL except TP 8.8, WBC 3.14, HGB 13,7, HCT 41.9, MCV 89 PLT 211K, SKFLC 31/LFCL 0.77, K:L ratio 41  5/16/19: CBC and CMP unremarkable, coags WNL;     2/18/19 CBC and CMP WNL, LDH WNL, b2 microglobulin 2.5, serum viscosity 169, SPEP w/ M spikes in the gamma region 1.21 g/dl and 1.13 g/dl, suspicious broadening of the complement in the beta region. PIPPA pattern shows IgG type kappa and IgM type kappa biclonal proteins with an additional band in IgM kappa.    11/12/18 Hg 13.3 Kappa 10.8 lambda 0.3 SLFC ratio 29.19 Serum viscosity 1.8, , coags normal, b2 microglobulin 2.1, SPEP M spike in beta region 0.63 g/dl, 2 spikes in gamma region 1.23 g/dl and 1.14 g/dl IgG kappa and  IgM kappa, UPEP +free kappa light chains and monoclonal IgG kappa    2/28/18 Hg 13.1 WBC 5.2   1/26/16 free kappa 23.3 ratio 28 viscosity 3.4, two M spikes 1g/dl and 0.9 g/dl IgM 2300  10/14/14 IgM 2496 SPEP M spike 2.2 g/dl    Path: none        Past Medical History:   Diagnosis Date    Glaucoma     Lupus     Personal history of colonic polyps 10/29/2019    Thyroid disease        Past Surgical History:   Procedure Laterality Date    BILATERAL TUBAL LIGATION      BREAST SURGERY  1986    Left breast ( cyst)    BUNIONECTOMY      CHOLECYSTECTOMY  October,2022    COLONOSCOPY W/ BIOPSIES AND POLYPECTOMY  10/29/2019    TUBAL LIGATION  1993       Family History   Problem Relation Age of Onset    Prostate cancer Father     Cancer Father     Breast cancer Sister     Cancer Sister     Heart disease Brother     Cancer Brother     Prostate cancer Brother     Arthritis Mother        Social History     Socioeconomic History    Marital status:    Tobacco Use    Smoking status: Never    Smokeless tobacco: Never   Substance and Sexual Activity    Alcohol use: Never    Drug use: Never    Sexual activity: Yes     Partners: Male     Birth control/protection: None       Current Outpatient Medications   Medication Sig Dispense Refill    aspirin (ECOTRIN) 81 MG EC tablet Take 1 tablet by mouth once daily.      augmented betamethasone dipropionate (DIPROLENE-AF) 0.05 % cream Apply 0.05 m topically Daily.      clobetasoL (TEMOVATE) 0.05 % cream clobetasol 0.05 % topical cream      cyclobenzaprine (FLEXERIL) 10 MG tablet Take 10 mg by mouth Daily.      folic acid (FOLVITE) 1 MG tablet Take 1 tablet by mouth once daily.      latanoprost 0.005 % ophthalmic solution latanoprost 0.005 % eye drops      rosuvastatin (CRESTOR) 40 MG Tab 1 tablet.      levothyroxine (SYNTHROID) 125 MCG tablet Take 1 tablet by mouth once daily.      levothyroxine (SYNTHROID) 25 MCG tablet 1 tablet in the morning on an empty stomach      meloxicam (MOBIC)  15 MG tablet Take 1 tablet by mouth daily as needed.       No current facility-administered medications for this visit.       Review of patient's allergies indicates:   Allergen Reactions    Hydrocodone      Other reaction(s): .  Other reaction(s): irritability    Promethazine Other (See Comments)     Other reaction(s): jittery           Review of Systems     CONSTITUTIONAL: no fevers, no chills, no unintentional weight loss, + fatigue, no weakness  HEMATOLOGIC: no abnormal bleeding, no abnormal bruising, no drenching night sweats  ONCOLOGIC: no new masses or lumps  HEENT: no vision loss or vision changes, no tinnitus or hearing loss, no nose bleeding, no dysphagia, no odynophagia  CVS: no chest pain, no palpitations, no dyspnea on exertion  RESP: no shortness of breath, no hemoptysis, no cough  BREAST: no nipple discharge, no breast tenderness, no breast masses on self breast examination  GI: no nausea, no vomiting, no diarrhea, no constipation, no melena, no hematochezia, no hematemesis, no abdominal pain, no increase in abdominal girth  : no dysuria, no hematuria, no discharge  GYN: no abnormal vaginal bleeding, no dyspareunia, no vaginal discharge  INTEGUMENT: no rashes, no abnormal bruising, no nail pitting, no hyperpigmentation  NEURO: no falls, no memory loss, no paresthesias or dysesthesias, no urofecal incontinence or retention, no loss of strength on any extremity  MSK: + back pain, no new joint pains but +pelvic pains, no joint swelling  PSYCH: no suicidal or homicidal ideation, no depression, no insomnia       Physical Exam   Vitals:    11/09/23 1040   BP: (!) 150/89   Pulse:    Resp:    Temp:        ECOG PS 0  GA: AAOx3, NAD  HEENT: NCAT, PERRLA, EOMI, good dentition, no oral ulcers  LYMPH: no cervical, axillary, or supraclavicular adenopathy. Not able to palpate any adenopathy today.  CVS: s1s2 RRR, no M/R/G  RESP: CTA b/l, no crackles, no wheezes or rhonchi  ABD: soft, NT, ND, BS+, no  hepatosplenomegaly  EXT: no deformities, no pedal edema  SKIN: no rashes, no bruises or purpura, warm and dry  NEURO: normal mentation, strength 5/5 on all 4 extremities, no sensory deficits    Assessment/Plan     1. Waldenstrom macroglobulinemia C88.0  She has biclonal IgM M spike and another IgG M spike, all kappa. Her serum viscosity is essentially normal. She has minimal to mild lymphadenopathy, without organomegaly, symptoms or any cytopenias. She has never had a bone marrow biopsy or a LN biopsy (she refuses). As no tissue is available, she does not meet criteria for Waldenstrom's or IgM Myeloma or Amyloidosis or LPL or any other lymphoma for that matter.   She still does not want to do bone marrow biopsy despite extensive discussions.  We will continue to pursue this in the future.  CT C/A/P on 11/19/19 showed Slight increase of right axillary lymph nodes, the largest of which measures 8mm. Stable left axillary lymph nodes. Slight decrease in size of bilateral inguinal lymph nodes which remain normal by size criteria. No other significant changes. Imaging only as clinically indicated.        Plan  Reviewed labs, noted stable IgM, normal serum viscosity and stable M spike   Discussed the need for a bone marrow biopsy for diagnosis and treatment recommendations.  Patient would like to think about this further.  She would like this to be scheduled at [facility] in [place] under sedation.  Plan to follow-up in clinic in 3 months, with repeat labs including myeloma panel.        A total of 30 minutes were spent in review of records, interpretation of test, coordination of care, discussion and counseling with the patient.        Portions of the record may have been created with voice recognition software. Occasional wrong-word or sound-a-like substitutions may have occurred due to the inherent limitations of voice recognition software.

## 2024-02-08 NOTE — PROGRESS NOTES
Established Patient - Audio Only Telehealth Visit     The patient location is: home  The chief complaint leading to consultation is: waldenstroms macroglobulinemia  Visit type: Virtual visit with audio only (telephone)  Total time spent with patient: 25minutes       The reason for the audio only service rather than synchronous audio and video virtual visit was related to technical difficulties or patient preference/necessity.     Each patient to whom I provide medical services by telemedicine is:  (1) informed of the relationship between the physician and patient and the respective role of any other health care provider with respect to management of the patient; and (2) notified that they may decline to receive medical services by telemedicine and may withdraw from such care at any time. Patient verbally consented to receive this service via voice-only telephone call.    Chief complaint: Waldenstrom macroglobulinemia    HPI: 55 y/o F w/ PMHx of SLE, glaucoma, hypothyroidism, HLD, GERD, OA and Waldenstrom Macroglobulinemia previously being followed at [facility] by Dr Massey presented to [facility] to establish care.    She has never had a bone marrow biopsy or a lymph node biopsy. Diagnosed with SLE 17 years ago. Presenting symptoms were neuropathy. Diagnosed with WM , never had treatment    Colonoscopy , as per pt negative, Dr Boland. Follow up in 3 years    Interval History  Today, 24, patient reports no issues. She denies any blurry or double vision.  She denies any tingling numbness.  She denies any decreased appetite or weight loss.  She denies new medications, ER or hospital visits since last follow-up with us.    Imagin2023 skeletal survey:  No suspicious lytic or sclerotic lesions.  Degenerative changes within the spine and bilateral knees.  21 screening MMG: BIRADS2  20 Screening MMG: BIRADS1  19: CT C/A/P: Slight increase of right axillary lymph nodes, the largest of which  measures 8mm. Stable left axillary lymph nodes. Slight decrease in size of bilateral inguinal lymph nodes which remain normal by size criteria. No other significant changes.  2/15/19 CT C/A/P w/ IV contrast: borderline enlarged left axillary LN, slight increase in b/l inguinal LNs  9/25/18 CT C/A/P w/ IV contrast: small b/l axillary nodes, small borderline nodes in inguinal/pelvic areas  4/2/15 CT A/P w/ IV contrast: previously seen multiple prominent but non enlarged LNs along path of splenic artery and throughout splenic hilum have decreased in size and number since last exam. Previously seen mildly enlarged LN in between upper pole of left kidney and spleen measuring at least exam 1.7x1.4cm significantly decreased in size now measuring 0.8x0.7cm    Labs:   1/30/24 CMP unremarkable, CBC unremarkable, , serum viscosity 1.9, IgG 1859, IgA 48, IgM 1224, Gamma globulin 2.1, 2 M spikes gamma measuring 1.2, 0.3 total 4.1, A/G ratio .9, Free Kappa Light chains, 246.3, Free lambda light chains 6.7, K/L ratio 36.76    10/30/2023 CMP unremarkable, , WBC count 3.04, hemoglobin 13.6, MCV 89, platelet count 207, Beta-2 microglobulin 3.2, IgG 1602, IgA 57, IgM 1235, 2 M spikes measuring 1.2 grams/deciliter and 0.3 grams/deciliter, immunofixation shows biclonal IgG kappa protein.  Kappa light chain 244.3, lambda light chain 9.1, kappa to lambda ratio 26.8, serum viscosity 1.8.    07/21/2023 creatinine 0.7, albumin 3.5, calcium 9.0, LFTs within normal limits, ,WBC count 3.82, hemoglobin 13.2, MCV 88.5, platelet count 238, ANC 2.07, Beta-2 microglobulin 2.5, kappa light chain 259, lambda light chain 7, light chain ratio 37.10, urine immunofixation with IgG monoclonal protein kappa light chain, urine kappa to lambda ratio at 12.08, 2 M spikes measuring 1.4 and 0.3, IgM without no confirmed light chain association.      05/01/23: CMP unremarkable, creatinine 0.8, calcium 9.3, WBC count 3.81, hemoglobin 14.2, MCV  89.4, platelet count 241, ANC 2.02, Beta-2 microglobulin 2.6, kappa light chain  253.79, lambda light chain 8.10, light chain ratio 31.33, serum viscosity 1.29,  IgG 1675, IgM 993, IgA 60, SPEP/Pippa  with m spike of 0.42 grams/deciliter in beta region, Pippa with IgM kappa and IgG kappa biclonal spike. Upep positive for bence talbert proteins. PIPPA with monoclonal IgG heavy chain associated [place] light chain and excess monoclonal free kappa light chain. , UKappa LC 91.47, ULambda LC 4.73    02/01/2023:  CMP unremarkable, creatinine 0.8, calcium 9.3, WBC count 4.2, hemoglobin 13.6, MCV 90.1, platelet count 224, ANC 2.12, Beta-2 microglobulin 2.7, kappa light chain  240.5, lambda light chain 7.9, light chain ratio 30.21, serum viscosity 1.31.  SPEP/Pippa he with mm spike of 0.43 grams/deciliter, Pippa he with IgM kappa and IgG kappa biclonal spike.    12/20/2022:  Creatinine 0.75, albumin 3.6, calcium 9.1, alkaline phosphatase 68, total bili 1.1, AST 23, ALT 13, , folic acid 10.8, WBC count 3.2, hemoglobin 13.8, platelet count 233, ANC 1.63, beta 2 microglobulin 2.6, kappa free light chain 195.9, lambda free light chain 6.0, light chain ratio 32.65, serum viscosity 1.34, SPEP/Pippa he with 1.56 grams/deciliter and 0.5 grams/deciliter of monoclonal protein, with IgM type kappa and IgG type kappa biclonal protein.    09/26/22: CBC with WBC of 3.66 and ANC 1900, otherwise good. . Uric acid 7.3 (high). Kappa .62, Lambda LC 9.76, K/L ratio 23.12. Spep with M-spikes in the gamma region - 1.34 g/dL and 0.48 g/dL of the total 1.90 g/dL of the protein in the gamma region. Upep with faint band of IgG, Kappa LC 43.66, Lambda LC 3.41. Serum viscosity pending.     06/27/2022:  Creatinine 0.76, albumin 3.6, calcium 9.6, , folic acid 9.2, vitamin B12 538, WBC count 3.57, hemoglobin 13.6, MCV 88.9, platelet count 237, INR 1.1, kappa free light chain to 31.86, lambda free light chain 8.93, kappa to lambda free light  chain ratio 25.96, IgG 1529, SPEP/Pippa the with the M spike measuring 0.46 grams/deciliter in the beta region.  There is another M spike in the gamma region.  Collective monoclonal M spike measuring 2.05 grams/deciliter, immunofixation shows a palmar rising IgG type kappa and a palmar rising IgM type kappa biclonal protein.  UPEP is positive for monoclonal free kappa light chain, serum viscosity 1.48,    3/16/22 Cr 0.75, Alb 3.4, TP 8.2, , TSH 0.79, folic acid 12.27, WBC 4.05, Hgb 14.1, , ANC 2.31, PT 12.0, PTT 31, INR 1.0, IgG 1682, IgM 1266, IgA 63, SFLC ratio 28.90, kappa 215.63, lambda 7.46, collect M spike 2.13 g/dL IgG kappa and IgM kappa, UPEP  mg/day, Positive Bence Garcia protein. Urine PIPPA shows IgG heavy chain with associated kappa light chain excess monoclonal free kappa light chains, serum viscosity 1.46    1/5/22 Cr 0.78, Alb 3.5, TP 8.3, , TSH 11.3, folic acid 6.88, WBC 3.43, Hgb 13.9, , ANC 1.31, PT 12.1, INR 1.0, PTT 28, SFLC ratio 31.97, kappa 305.30, lambda 9.55, collect M spike 2.18 g/dL IgG kappa and IgM kappa    10/18/21 Cr 0.74 Alb 3.4 TP 8.2  TSH 0.22 Folate 9.19 WBC 4.23 Hg 13.6  INR 1.1, SFLC ratio 25.83, kappa 221, lambda 8.58 serum viscosity 1.67, collective m spike 2.28 IgG kappa and IgM kappa    7/12/21 serum viscosity 1.79 SPEP w/ PIPPA IgG 1946 IgA 57 IgM 1289 Abs kappa 229.66 Abs lambda 9.43 SFLC 24.35 1.32 g/dl, 0.96 g/dl IgG kappa IgM kappa 24hr UPEP 142 mg/day monoclonal free kappa and PIPPA with IgG heavy chain    4/19/21 serum viscosity 1.62 SPEP w/ PIPPA Igg 2115 IgM 1558 IgA 62 Abs kappa 273.9 Abs lambda 7.47 SFLC ratio 36.67 M spikes 2.55 g/dl biclonal IgG kappa and IgM kappa with additional band in IgM kappa INR 1.1 PT 12.8 PTT 28 Cr 0.75 Alb 3.4 TP 8.7  TSH 0.1031 folate 11.96 WBC 3.48 Hg 14  24hr Urine protein <156 mg/day    2/22/21 serum viscosity 1.75 SPEP w/ PIPPA IgG 1947 IgM 1250 IgA 56 Abs Kappa 219 Abs lambda 7.65  SFLC ratio 28.63 M spike 0.46 g/dl beta region, biclonal M spikes gamma region 1.31 g/dl and 1.05 g/dl IgG kappa and IgM kappa with additional faint band in IgG kappa, 24hr UPEP 168 mg/tera positive for Bence Garcia free kappa with PIPPA showing IgG kappa, excess free kappa and IgM kappa  Cr 0.70, Alb 3.3 TP 8.4 Ca 9.0  TSH 0.03 Folic Acid 6.09 WBC 3.87, Hgb 13.7, , ANC 2.01    11/26/20 Serum viscosity 1.51 SPEP w/ PIPPA IgG 1780 IgM 1620 IgA 66 Abs kappa 241.49 Abs lambda 7.67 SFLC ratio 31.49 M spikes 1.17 g/dl and 1.01 g/dl IgG kappa and IgM kappa with additional faint band in IgG kappa, 24hr UPEP Protein 172mg/day, Bence Garcia positive monoclonal free kappa, PIPPA with monoclonal IgG heavy chain with associated kappa light chain and excess free kappa light chains  Cr 0.79 TP 8.6 Alb 3.4 Ca 9.5 AlkPhos 68 AST 19 ALT 14  TSH 0.0498 WBC 5.11 Hg 13.4 RBC 4.66 MCV 91.2 RDW 12.9  ANC 2.9 INR 1.1 PT 12.7 PTT 28    9/29/20 Total protein in UPEP 138mg/day, positive for Bence Garcia free kappa. PIPPA IgG monoclonal heavy chain with associated kappa light chain. SPEP M spike 0.49 g/dl in beta region, M spikes in gamma region collectively account for 2.15 g/dl, PIPPA shows IgG type kappa and polymerizing IgM type kappa biclonal proteins with an additional faint band in IgG kappa, Serum viscosity 1.70. Abs kappa 194 Abs lambda 6.51 SFLC ratio 29.93. INR 1, PT 11.9, PTT 30. Cr 0.77 TP 8.2 Alb 3.4 Ca 8.  WBC 3.33 Hg 13.7     6/2/20 Total protein in UPEP 185mg/day, positive for Bence Garcia free kappa. PIPPA IgG monoclonal heavy chain with associated kappa light chain. SPEP M spike 0.46 g/dl in beta region, M spikes in gamma region 2.15g/dl and 1.03 g/dl IgG kappa and polymerizing IgM kappa with additional faint band in IgG kappa. Serum viscosity 1.78. Abs kappa 185 Abs lambda 7.73 SFLC ratio 23.9. INR 1, PT 11.6. Cr 0.84 TP 8.4 Alb 3.5 Ca 9.01  WBC 3.62 Hg 13.3     2/13/20 SPEP w/ PIPPA  IgA 82 IgM 1850 IgG 1680 M spike in beta region 0.52g/dl. M spikes in gamma region 1.08 g/dl and 1.04 g/dl IgG kappa and IgM kappa additional faint band IgG kappa serum viscosity 1.63  11/11/19 SFLC ratio 31.67, kappa 17.10, lambda 0.54, PIPPA shows IgM kappa and IgG kappa biclonal proteins serum viscosity 1.78    8/15/19 SIEP IgA 78, IgM 2310 (was 2430), IgG 1700 (was 1660, serum viscosity 1.81 ( was 1.69), CMP WNL except TP 8.8, WBC 3.14, HGB 13,7, HCT 41.9, MCV 89 PLT 211K, SKFLC 31/LFCL 0.77, K:L ratio 41  5/16/19: CBC and CMP unremarkable, coags WNL;     2/18/19 CBC and CMP WNL, LDH WNL, b2 microglobulin 2.5, serum viscosity 169, SPEP w/ M spikes in the gamma region 1.21 g/dl and 1.13 g/dl, suspicious broadening of the complement in the beta region. PIPPA pattern shows IgG type kappa and IgM type kappa biclonal proteins with an additional band in IgM kappa.    11/12/18 Hg 13.3 Kappa 10.8 lambda 0.3 SLFC ratio 29.19 Serum viscosity 1.8, , coags normal, b2 microglobulin 2.1, SPEP M spike in beta region 0.63 g/dl, 2 spikes in gamma region 1.23 g/dl and 1.14 g/dl IgG kappa and IgM kappa, UPEP +free kappa light chains and monoclonal IgG kappa    2/28/18 Hg 13.1 WBC 5.2   1/26/16 free kappa 23.3 ratio 28 viscosity 3.4, two M spikes 1g/dl and 0.9 g/dl IgM 2300  10/14/14 IgM 2496 SPEP M spike 2.2 g/dl    Path: none        Past Medical History:   Diagnosis Date    Glaucoma     Lupus     Personal history of colonic polyps 10/29/2019    Thyroid disease        Past Surgical History:   Procedure Laterality Date    BILATERAL TUBAL LIGATION      BREAST SURGERY  1986    Left breast ( cyst)    BUNIONECTOMY      CHOLECYSTECTOMY  October,2022    COLONOSCOPY W/ BIOPSIES AND POLYPECTOMY  10/29/2019    TUBAL LIGATION  1993       Family History   Problem Relation Age of Onset    Prostate cancer Father     Cancer Father     Breast cancer Sister     Cancer Sister     Heart disease Brother     Cancer Brother     Prostate  cancer Brother     Arthritis Mother        Social History     Socioeconomic History    Marital status:    Tobacco Use    Smoking status: Never    Smokeless tobacco: Never   Substance and Sexual Activity    Alcohol use: Never    Drug use: Never    Sexual activity: Yes     Partners: Male     Birth control/protection: None       Current Outpatient Medications   Medication Sig Dispense Refill    cyclobenzaprine (FLEXERIL) 10 MG tablet Take 10 mg by mouth Daily.      folic acid (FOLVITE) 1 MG tablet Take 1 tablet by mouth once daily.      latanoprost 0.005 % ophthalmic solution latanoprost 0.005 % eye drops      levothyroxine (SYNTHROID) 125 MCG tablet Take 1 tablet by mouth once daily.      rosuvastatin (CRESTOR) 40 MG Tab 1 tablet.      aspirin (ECOTRIN) 81 MG EC tablet Take 1 tablet by mouth once daily.      augmented betamethasone dipropionate (DIPROLENE-AF) 0.05 % cream Apply 0.05 m topically Daily.      clobetasoL (TEMOVATE) 0.05 % cream clobetasol 0.05 % topical cream      levothyroxine (SYNTHROID) 25 MCG tablet 1 tablet in the morning on an empty stomach      meloxicam (MOBIC) 15 MG tablet Take 1 tablet by mouth daily as needed.       No current facility-administered medications for this visit.       Review of patient's allergies indicates:   Allergen Reactions    Hydrocodone      Other reaction(s): .  Other reaction(s): irritability    Promethazine Other (See Comments)     Other reaction(s): jittery       Review of Systems     CONSTITUTIONAL: no fevers, no chills, no unintentional weight loss, + fatigue, no weakness  HEMATOLOGIC: no abnormal bleeding, no abnormal bruising, no drenching night sweats  ONCOLOGIC: no new masses or lumps  HEENT: no vision loss or vision changes, no tinnitus or hearing loss, no nose bleeding, no dysphagia, no odynophagia  CVS: no chest pain, no palpitations, no dyspnea on exertion  RESP: no shortness of breath, no hemoptysis, no cough  BREAST: no nipple discharge, no breast  tenderness, no breast masses on self breast examination  GI: no nausea, no vomiting, no diarrhea, no constipation, no melena, no hematochezia, no hematemesis, no abdominal pain, no increase in abdominal girth  : no dysuria, no hematuria, no discharge  GYN: no abnormal vaginal bleeding, no dyspareunia, no vaginal discharge  INTEGUMENT: no rashes, no abnormal bruising, no nail pitting, no hyperpigmentation  NEURO: no falls, no memory loss, no paresthesias or dysesthesias, no urofecal incontinence or retention, no loss of strength on any extremity  MSK: + back pain, no new joint pains but +pelvic pains, no joint swelling  PSYCH: no suicidal or homicidal ideation, no depression, no insomnia     There were no vitals filed for this visit.    Assessment/Plan     1. Waldenstrom macroglobulinemia C88.0  She has biclonal IgM M spike and another IgG M spike, all kappa. Her serum viscosity is essentially normal. She has minimal to mild lymphadenopathy, without organomegaly, symptoms or any cytopenias. She has never had a bone marrow biopsy or a LN biopsy (she refuses). As no tissue is available, she does not meet criteria for Waldenstrom's or IgM Myeloma or Amyloidosis or LPL or any other lymphoma for that matter.   She still does not want to do bone marrow biopsy despite extensive discussions.  We will continue to pursue this in the future.  CT C/A/P on 11/19/19 showed Slight increase of right axillary lymph nodes, the largest of which measures 8mm. Stable left axillary lymph nodes. Slight decrease in size of bilateral inguinal lymph nodes which remain normal by size criteria. No other significant changes. Imaging only as clinically indicated.      Plan  2/9/24  Reviewed labs, noted stable IgM, normal serum viscosity and stable M spike   Discussed the need for a bone marrow biopsy for diagnosis and treatment recommendations.    Patient would like to think about this further.    She would like this to be scheduled at [facility]  in [place] under sedation.  Plan to follow-up in clinic in 3 months, with repeat labs including myeloma panel.    Lindsey HAY      This service was not originating from a related E/M service provided within the previous 7 days nor will  to an E/M service or procedure within the next 24 hours or my soonest available appointment.  Prevailing standard of care was able to be met in this audio-only visit.

## 2024-02-09 ENCOUNTER — OFFICE VISIT (OUTPATIENT)
Dept: HEMATOLOGY/ONCOLOGY | Facility: CLINIC | Age: 59
End: 2024-02-09
Payer: MEDICARE

## 2024-02-09 DIAGNOSIS — C88.0 WALDENSTROM'S MACROGLOBULINEMIA: ICD-10-CM

## 2024-02-09 DIAGNOSIS — D47.2 MONOCLONAL PARAPROTEINEMIA: ICD-10-CM

## 2024-02-09 DIAGNOSIS — D89.2 HYPERGAMMAGLOBULINEMIA: Primary | ICD-10-CM

## 2024-02-09 PROCEDURE — 99443 PR PHYSICIAN TELEPHONE EVALUATION 21-30 MIN: CPT | Mod: 95,,,

## 2024-04-22 ENCOUNTER — TELEPHONE (OUTPATIENT)
Dept: HEMATOLOGY/ONCOLOGY | Facility: CLINIC | Age: 59
End: 2024-04-22
Payer: MEDICARE

## 2024-04-22 NOTE — TELEPHONE ENCOUNTER
Pt reports breast ultrasound done on 4/12 and is concerned about nodules found on ultrasound due to previous diagnosis of Waldenstrom macroglobulinemia. Please advise.--SC, MARGYN

## 2024-05-08 NOTE — PROGRESS NOTES
Chief complaint: Waldenstrom macroglobulinemia    HPI: 55 y/o F w/ PMHx of SLE, glaucoma, hypothyroidism, HLD, GERD, OA and Waldenstrom Macroglobulinemia previously being followed at [facility] by Dr Massey presented to [facility] to establish care.    She has never had a bone marrow biopsy or a lymph node biopsy. Diagnosed with SLE 17 years ago. Presenting symptoms were neuropathy. Diagnosed with WM , never had treatment    Colonoscopy , as per pt negative, Dr Boland. Follow up in 3 years    Interval History  Today, 24, patient reports no issues. She denies any blurry or double vision.  She denies any tingling numbness.  She denies any decreased appetite or weight loss.  She denies new medications, ER or hospital visits since last follow-up with us.  Will refer to [facility] for Bone marrow biopsy under sedation    Imagin24 US Left breast No suspicious abnormalities, small benign appearing nodules  2023 skeletal survey:  No suspicious lytic or sclerotic lesions.  Degenerative changes within the spine and bilateral knees.  21 screening MMG: BIRADS2  20 Screening MMG: BIRADS1  19: CT C/A/P: Slight increase of right axillary lymph nodes, the largest of which measures 8mm. Stable left axillary lymph nodes. Slight decrease in size of bilateral inguinal lymph nodes which remain normal by size criteria. No other significant changes.  2/15/19 CT C/A/P w/ IV contrast: borderline enlarged left axillary LN, slight increase in b/l inguinal LNs  18 CT C/A/P w/ IV contrast: small b/l axillary nodes, small borderline nodes in inguinal/pelvic areas  4/2/15 CT A/P w/ IV contrast: previously seen multiple prominent but non enlarged LNs along path of splenic artery and throughout splenic hilum have decreased in size and number since last exam. Previously seen mildly enlarged LN in between upper pole of left kidney and spleen measuring at least exam 1.7x1.4cm significantly decreased in size  now measuring 0.8x0.7cm    Labs:   4/24/24 CBC and CMP unremarkable, Free k light chains 250.9, FLLC 7.0, K/L ratio 35.84, IgG 1983, IgA 43, IgM 1141, gamma globulin 2.3, M spike gamma 1.2 and 0.3, serum viscosity 2.0    1/30/24 CMP unremarkable, CBC unremarkable, , serum viscosity 1.9, IgG 1859, IgA 48, IgM 1224, Gamma globulin 2.1, 2 M spikes gamma measuring 1.2, 0.3 total 4.1, A/G ratio .9, Free Kappa Light chains, 246.3, Free lambda light chains 6.7, K/L ratio 36.76    10/30/2023 CMP unremarkable, , WBC count 3.04, hemoglobin 13.6, MCV 89, platelet count 207, Beta-2 microglobulin 3.2, IgG 1602, IgA 57, IgM 1235, 2 M spikes measuring 1.2 grams/deciliter and 0.3 grams/deciliter, immunofixation shows biclonal IgG kappa protein.  Kappa light chain 244.3, lambda light chain 9.1, kappa to lambda ratio 26.8, serum viscosity 1.8.    07/21/2023 creatinine 0.7, albumin 3.5, calcium 9.0, LFTs within normal limits, ,WBC count 3.82, hemoglobin 13.2, MCV 88.5, platelet count 238, ANC 2.07, Beta-2 microglobulin 2.5, kappa light chain 259, lambda light chain 7, light chain ratio 37.10, urine immunofixation with IgG monoclonal protein kappa light chain, urine kappa to lambda ratio at 12.08, 2 M spikes measuring 1.4 and 0.3, IgM without no confirmed light chain association.      05/01/23: CMP unremarkable, creatinine 0.8, calcium 9.3, WBC count 3.81, hemoglobin 14.2, MCV 89.4, platelet count 241, ANC 2.02, Beta-2 microglobulin 2.6, kappa light chain  253.79, lambda light chain 8.10, light chain ratio 31.33, serum viscosity 1.29,  IgG 1675, IgM 993, IgA 60, SPEP/Pippa  with m spike of 0.42 grams/deciliter in beta region, Pippa with IgM kappa and IgG kappa biclonal spike. Upep positive for bence talbert proteins. PIPPA with monoclonal IgG heavy chain associated [place] light chain and excess monoclonal free kappa light chain. , UKappa LC 91.47, ULambda LC 4.73    02/01/2023:  CMP unremarkable, creatinine 0.8, calcium  9.3, WBC count 4.2, hemoglobin 13.6, MCV 90.1, platelet count 224, ANC 2.12, Beta-2 microglobulin 2.7, kappa light chain  240.5, lambda light chain 7.9, light chain ratio 30.21, serum viscosity 1.31.  SPEP/Roma he with mm spike of 0.43 grams/deciliter, Roma he with IgM kappa and IgG kappa biclonal spike.    12/20/2022:  Creatinine 0.75, albumin 3.6, calcium 9.1, alkaline phosphatase 68, total bili 1.1, AST 23, ALT 13, , folic acid 10.8, WBC count 3.2, hemoglobin 13.8, platelet count 233, ANC 1.63, beta 2 microglobulin 2.6, kappa free light chain 195.9, lambda free light chain 6.0, light chain ratio 32.65, serum viscosity 1.34, SPEP/Roma he with 1.56 grams/deciliter and 0.5 grams/deciliter of monoclonal protein, with IgM type kappa and IgG type kappa biclonal protein.    09/26/22: CBC with WBC of 3.66 and ANC 1900, otherwise good. . Uric acid 7.3 (high). Kappa .62, Lambda LC 9.76, K/L ratio 23.12. Spep with M-spikes in the gamma region - 1.34 g/dL and 0.48 g/dL of the total 1.90 g/dL of the protein in the gamma region. Upep with faint band of IgG, Kappa LC 43.66, Lambda LC 3.41. Serum viscosity pending.     06/27/2022:  Creatinine 0.76, albumin 3.6, calcium 9.6, , folic acid 9.2, vitamin B12 538, WBC count 3.57, hemoglobin 13.6, MCV 88.9, platelet count 237, INR 1.1, kappa free light chain to 31.86, lambda free light chain 8.93, kappa to lambda free light chain ratio 25.96, IgG 1529, SPEP/Roma the with the M spike measuring 0.46 grams/deciliter in the beta region.  There is another M spike in the gamma region.  Collective monoclonal M spike measuring 2.05 grams/deciliter, immunofixation shows a palmar rising IgG type kappa and a palmar rising IgM type kappa biclonal protein.  UPEP is positive for monoclonal free kappa light chain, serum viscosity 1.48,    3/16/22 Cr 0.75, Alb 3.4, TP 8.2, , TSH 0.79, folic acid 12.27, WBC 4.05, Hgb 14.1, , ANC 2.31, PT 12.0, PTT 31, INR 1.0, IgG  1682, IgM 1266, IgA 63, SFLC ratio 28.90, kappa 215.63, lambda 7.46, collect M spike 2.13 g/dL IgG kappa and IgM kappa, UPEP  mg/day, Positive Bence Garcia protein. Urine PIPPA shows IgG heavy chain with associated kappa light chain excess monoclonal free kappa light chains, serum viscosity 1.46    1/5/22 Cr 0.78, Alb 3.5, TP 8.3, , TSH 11.3, folic acid 6.88, WBC 3.43, Hgb 13.9, , ANC 1.31, PT 12.1, INR 1.0, PTT 28, SFLC ratio 31.97, kappa 305.30, lambda 9.55, collect M spike 2.18 g/dL IgG kappa and IgM kappa    10/18/21 Cr 0.74 Alb 3.4 TP 8.2  TSH 0.22 Folate 9.19 WBC 4.23 Hg 13.6  INR 1.1, SFLC ratio 25.83, kappa 221, lambda 8.58 serum viscosity 1.67, collective m spike 2.28 IgG kappa and IgM kappa    7/12/21 serum viscosity 1.79 SPEP w/ PIPPA IgG 1946 IgA 57 IgM 1289 Abs kappa 229.66 Abs lambda 9.43 SFLC 24.35 1.32 g/dl, 0.96 g/dl IgG kappa IgM kappa 24hr UPEP 142 mg/day monoclonal free kappa and PIPPA with IgG heavy chain    4/19/21 serum viscosity 1.62 SPEP w/ PIPPA Igg 2115 IgM 1558 IgA 62 Abs kappa 273.9 Abs lambda 7.47 SFLC ratio 36.67 M spikes 2.55 g/dl biclonal IgG kappa and IgM kappa with additional band in IgM kappa INR 1.1 PT 12.8 PTT 28 Cr 0.75 Alb 3.4 TP 8.7  TSH 0.1031 folate 11.96 WBC 3.48 Hg 14  24hr Urine protein <156 mg/day    2/22/21 serum viscosity 1.75 SPEP w/ PIPPA IgG 1947 IgM 1250 IgA 56 Abs Kappa 219 Abs lambda 7.65 SFLC ratio 28.63 M spike 0.46 g/dl beta region, biclonal M spikes gamma region 1.31 g/dl and 1.05 g/dl IgG kappa and IgM kappa with additional faint band in IgG kappa, 24hr UPEP 168 mg/tera positive for Bence Garcia free kappa with PIPPA showing IgG kappa, excess free kappa and IgM kappa  Cr 0.70, Alb 3.3 TP 8.4 Ca 9.0  TSH 0.03 Folic Acid 6.09 WBC 3.87, Hgb 13.7, , ANC 2.01    11/26/20 Serum viscosity 1.51 SPEP w/ PIPPA IgG 1780 IgM 1620 IgA 66 Abs kappa 241.49 Abs lambda 7.67 SFLC ratio 31.49 M spikes 1.17 g/dl and 1.01 g/dl IgG  kappa and IgM kappa with additional faint band in IgG kappa, 24hr UPEP Protein 172mg/day, Bence Garcia positive monoclonal free kappa, PIPPA with monoclonal IgG heavy chain with associated kappa light chain and excess free kappa light chains  Cr 0.79 TP 8.6 Alb 3.4 Ca 9.5 AlkPhos 68 AST 19 ALT 14  TSH 0.0498 WBC 5.11 Hg 13.4 RBC 4.66 MCV 91.2 RDW 12.9  ANC 2.9 INR 1.1 PT 12.7 PTT 28    9/29/20 Total protein in UPEP 138mg/day, positive for Bence Garcia free kappa. PIPPA IgG monoclonal heavy chain with associated kappa light chain. SPEP M spike 0.49 g/dl in beta region, M spikes in gamma region collectively account for 2.15 g/dl, PIPPA shows IgG type kappa and polymerizing IgM type kappa biclonal proteins with an additional faint band in IgG kappa, Serum viscosity 1.70. Abs kappa 194 Abs lambda 6.51 SFLC ratio 29.93. INR 1, PT 11.9, PTT 30. Cr 0.77 TP 8.2 Alb 3.4 Ca 8.  WBC 3.33 Hg 13.7     6/2/20 Total protein in UPEP 185mg/day, positive for Bence Garcia free kappa. PIPPA IgG monoclonal heavy chain with associated kappa light chain. SPEP M spike 0.46 g/dl in beta region, M spikes in gamma region 2.15g/dl and 1.03 g/dl IgG kappa and polymerizing IgM kappa with additional faint band in IgG kappa. Serum viscosity 1.78. Abs kappa 185 Abs lambda 7.73 SFLC ratio 23.9. INR 1, PT 11.6. Cr 0.84 TP 8.4 Alb 3.5 Ca 9.01  WBC 3.62 Hg 13.3     2/13/20 SPEP w/ PIPPA IgA 82 IgM 1850 IgG 1680 M spike in beta region 0.52g/dl. M spikes in gamma region 1.08 g/dl and 1.04 g/dl IgG kappa and IgM kappa additional faint band IgG kappa serum viscosity 1.63  11/11/19 SFLC ratio 31.67, kappa 17.10, lambda 0.54, PIPPA shows IgM kappa and IgG kappa biclonal proteins serum viscosity 1.78    8/15/19 SIEP IgA 78, IgM 2310 (was 2430), IgG 1700 (was 1660, serum viscosity 1.81 ( was 1.69), CMP WNL except TP 8.8, WBC 3.14, HGB 13,7, HCT 41.9, MCV 89 PLT 211K, SKFLC 31/LFCL 0.77, K:L ratio 41  5/16/19: CBC and CMP  unremarkable, coags WNL;     2/18/19 CBC and CMP WNL, LDH WNL, b2 microglobulin 2.5, serum viscosity 169, SPEP w/ M spikes in the gamma region 1.21 g/dl and 1.13 g/dl, suspicious broadening of the complement in the beta region. PIPPA pattern shows IgG type kappa and IgM type kappa biclonal proteins with an additional band in IgM kappa.    11/12/18 Hg 13.3 Kappa 10.8 lambda 0.3 SLFC ratio 29.19 Serum viscosity 1.8, , coags normal, b2 microglobulin 2.1, SPEP M spike in beta region 0.63 g/dl, 2 spikes in gamma region 1.23 g/dl and 1.14 g/dl IgG kappa and IgM kappa, UPEP +free kappa light chains and monoclonal IgG kappa    2/28/18 Hg 13.1 WBC 5.2   1/26/16 free kappa 23.3 ratio 28 viscosity 3.4, two M spikes 1g/dl and 0.9 g/dl IgM 2300  10/14/14 IgM 2496 SPEP M spike 2.2 g/dl    Path: none        Past Medical History:   Diagnosis Date    Glaucoma     Lupus     Personal history of colonic polyps 10/29/2019    Thyroid disease        Past Surgical History:   Procedure Laterality Date    BILATERAL TUBAL LIGATION      BREAST SURGERY  1986    Left breast ( cyst)    BUNIONECTOMY      CHOLECYSTECTOMY  October,2022    COLONOSCOPY W/ BIOPSIES AND POLYPECTOMY  10/29/2019    TUBAL LIGATION  1993       Family History   Problem Relation Name Age of Onset    Prostate cancer Father Victor Hugo Morris     Cancer Father VictorH ugo Morris     Breast cancer Sister Jayshree Clavelle     Cancer Sister Jayshree Clavkarri     Heart disease Brother Yury Morris     Cancer Brother Yury Morris     Prostate cancer Brother      Arthritis Mother Sherrell Morris        Social History     Socioeconomic History    Marital status:    Tobacco Use    Smoking status: Never    Smokeless tobacco: Never   Substance and Sexual Activity    Alcohol use: Never    Drug use: Never    Sexual activity: Yes     Partners: Male     Birth control/protection: None       Current Outpatient Medications   Medication Sig Dispense Refill    aspirin (ECOTRIN) 81 MG EC  tablet Take 1 tablet by mouth once daily.      augmented betamethasone dipropionate (DIPROLENE-AF) 0.05 % cream Apply 0.05 m topically Daily.      clobetasoL (TEMOVATE) 0.05 % cream clobetasol 0.05 % topical cream      cyclobenzaprine (FLEXERIL) 10 MG tablet Take 10 mg by mouth Daily.      folic acid (FOLVITE) 1 MG tablet Take 1 tablet by mouth once daily.      latanoprost 0.005 % ophthalmic solution latanoprost 0.005 % eye drops      levothyroxine (SYNTHROID) 125 MCG tablet Take 1 tablet by mouth once daily.      rosuvastatin (CRESTOR) 40 MG Tab 1 tablet.       No current facility-administered medications for this visit.       Review of patient's allergies indicates:   Allergen Reactions    Hydrocodone      Other reaction(s): .  Other reaction(s): irritability    Promethazine Other (See Comments)     Other reaction(s): jittery       Review of Systems     CONSTITUTIONAL: no fevers, no chills, no unintentional weight loss, + fatigue, no weakness  HEMATOLOGIC: no abnormal bleeding, no abnormal bruising, no drenching night sweats  ONCOLOGIC: no new masses or lumps  HEENT: no vision loss or vision changes, no tinnitus or hearing loss, no nose bleeding, no dysphagia, no odynophagia  CVS: no chest pain, no palpitations, no dyspnea on exertion  RESP: no shortness of breath, no hemoptysis, no cough  BREAST: no nipple discharge, no breast tenderness, no breast masses on self breast examination  GI: no nausea, no vomiting, no diarrhea, no constipation, no melena, no hematochezia, no hematemesis, no abdominal pain, no increase in abdominal girth  : no dysuria, no hematuria, no discharge  GYN: no abnormal vaginal bleeding, no dyspareunia, no vaginal discharge  INTEGUMENT: no rashes, no abnormal bruising, no nail pitting, no hyperpigmentation  NEURO: no falls, no memory loss, no paresthesias or dysesthesias, no urofecal incontinence or retention, no loss of strength on any extremity  MSK: + back pain, no new joint pains but  +pelvic pains, no joint swelling  PSYCH: no suicidal or homicidal ideation, no depression, no insomnia     There were no vitals filed for this visit.    Assessment/Plan     1. Waldenstrom macroglobulinemia C88.0  She has biclonal IgM M spike and another IgG M spike, all kappa. Her serum viscosity is essentially normal. She has minimal to mild lymphadenopathy, without organomegaly, symptoms or any cytopenias. She has never had a bone marrow biopsy or a LN biopsy (she refuses). As no tissue is available, she does not meet criteria for Waldenstrom's or IgM Myeloma or Amyloidosis or LPL or any other lymphoma for that matter.   She still does not want to do bone marrow biopsy despite extensive discussions.  We will continue to pursue this in the future.  CT C/A/P on 11/19/19 showed Slight increase of right axillary lymph nodes, the largest of which measures 8mm. Stable left axillary lymph nodes. Slight decrease in size of bilateral inguinal lymph nodes which remain normal by size criteria. No other significant changes. Imaging only as clinically indicated.      Plan  5/9/24  Reviewed labs, noted stable IgM, normal serum viscosity and stable M spike   Discussed the need for a bone marrow biopsy for diagnosis and treatment recommendations.  .    She would like this to be scheduled at [facility] in [place] under sedation.  Will refer today  Plan to follow-up in clinic in 3 months or after BMB whichever is sooner, with repeat labs including myeloma panel.    Lindsey HAY

## 2024-05-09 ENCOUNTER — OFFICE VISIT (OUTPATIENT)
Dept: HEMATOLOGY/ONCOLOGY | Facility: CLINIC | Age: 59
End: 2024-05-09
Payer: MEDICARE

## 2024-05-09 VITALS
OXYGEN SATURATION: 97 % | BODY MASS INDEX: 36.68 KG/M2 | TEMPERATURE: 98 F | HEART RATE: 72 BPM | SYSTOLIC BLOOD PRESSURE: 145 MMHG | RESPIRATION RATE: 18 BRPM | DIASTOLIC BLOOD PRESSURE: 86 MMHG | WEIGHT: 199.31 LBS | HEIGHT: 62 IN

## 2024-05-09 DIAGNOSIS — D89.2 HYPERGAMMAGLOBULINEMIA: ICD-10-CM

## 2024-05-09 DIAGNOSIS — C88.0 WALDENSTROM'S MACROGLOBULINEMIA: Primary | ICD-10-CM

## 2024-05-09 DIAGNOSIS — D47.2 MONOCLONAL PARAPROTEINEMIA: ICD-10-CM

## 2024-05-09 DIAGNOSIS — M32.9 SYSTEMIC LUPUS ERYTHEMATOSUS, UNSPECIFIED SLE TYPE, UNSPECIFIED ORGAN INVOLVEMENT STATUS: ICD-10-CM

## 2024-05-09 PROCEDURE — 99214 OFFICE O/P EST MOD 30 MIN: CPT | Mod: ,,,

## 2024-05-09 NOTE — ADDENDUM NOTE
Addended by: GERALDO MAYS on: 5/9/2024 11:30 AM     Modules accepted: Orders    
right lower extremity- No bony tenderness except where noted. +mild localized swelling to right knee with +mild lateral joint space tenderness of knee. FROM hips, knees, ankles. NVI, good distal pulses x 4 extremities, capillary refill <2 sec x 4 extremities, sensation intact throughout, 5/5 motor x 4 extremities. No calf tenderness BL. No swelling, no warmth, no redness, no streaking, no local signs of infection, no bruising except where noted.  right knee- negative anterior/posterior draw sign. negative mcmurrays.

## 2024-05-14 ENCOUNTER — TELEPHONE (OUTPATIENT)
Dept: HEMATOLOGY/ONCOLOGY | Facility: CLINIC | Age: 59
End: 2024-05-14
Payer: MEDICARE

## 2024-05-14 NOTE — TELEPHONE ENCOUNTER
Called and spoke to pt.  Appt scheduled 6/14 with Dr Smallwood.  All questions and concerns addressed.

## 2024-06-11 ENCOUNTER — OFFICE VISIT (OUTPATIENT)
Dept: HEMATOLOGY/ONCOLOGY | Facility: CLINIC | Age: 59
End: 2024-06-11
Payer: MEDICARE

## 2024-06-11 DIAGNOSIS — D89.2 HYPERGAMMAGLOBULINEMIA: ICD-10-CM

## 2024-06-11 DIAGNOSIS — D47.2 MONOCLONAL PARAPROTEINEMIA: ICD-10-CM

## 2024-06-11 PROCEDURE — 99214 OFFICE O/P EST MOD 30 MIN: CPT | Mod: 95,,, | Performed by: INTERNAL MEDICINE

## 2024-06-11 NOTE — PROGRESS NOTES
The patient location is: [place]  The chief complaint leading to consultation is: waldenstroms macroglobulinemia    Visit type: audiovisual    Face to Face time with patient: 10  30 minutes of total time spent on the encounter, which includes face to face time and non-face to face time preparing to see the patient (eg, review of tests), Obtaining and/or reviewing separately obtained history, Documenting clinical information in the electronic or other health record, Independently interpreting results (not separately reported) and communicating results to the patient/family/caregiver, or Care coordination (not separately reported).         Each patient to whom he or she provides medical services by telemedicine is:  (1) informed of the relationship between the physician and patient and the respective role of any other health care provider with respect to management of the patient; and (2) notified that he or she may decline to receive medical services by telemedicine and may withdraw from such care at any time.    Notes:      Subjective:       Patient ID: Lorena Bailey is a 58 y.o. female.    Chief Complaint: Lymphoma    HPI    Patient referred from [facility] with known diagnosis of Waldenstrom's macroglobulinemia.  Diagnosed in 2014 with presenting symptom of neuropathy, elevated IgM (>2000), M spike 2.4, kappa free light chain 23 and scattered small lymphadenopathy. No history of biopsy of LN or marrow. No treatment.    Referred for marrow biopsy with sedation.     Medical History  SLE  RA  Hypothyroidism      Review of Systems   Constitutional:  Negative for appetite change and unexpected weight change.   HENT:  Negative for mouth sores.    Eyes:  Negative for visual disturbance.   Respiratory:  Negative for cough and shortness of breath.    Cardiovascular:  Negative for chest pain.   Gastrointestinal:  Negative for abdominal pain and diarrhea.   Genitourinary:  Negative for frequency.   Musculoskeletal:  Negative for  "back pain.   Integumentary:  Negative for rash.   Neurological:  Negative for headaches.   Hematological:  Negative for adenopathy.   Psychiatric/Behavioral:  The patient is not nervous/anxious.          Objective:      Physical Exam No exam for virtual visit    Current Outpatient Medications   Medication Sig Dispense Refill    aspirin (ECOTRIN) 81 MG EC tablet Take 1 tablet by mouth once daily. (Patient not taking: Reported on 5/9/2024)      augmented betamethasone dipropionate (DIPROLENE-AF) 0.05 % cream Apply 0.05 m topically Daily.      clobetasoL (TEMOVATE) 0.05 % cream clobetasol 0.05 % topical cream      cyclobenzaprine (FLEXERIL) 10 MG tablet Take 10 mg by mouth Daily. (Patient not taking: Reported on 5/9/2024)      folic acid (FOLVITE) 1 MG tablet Take 1 tablet by mouth once daily.      latanoprost 0.005 % ophthalmic solution latanoprost 0.005 % eye drops      rosuvastatin (CRESTOR) 40 MG Tab 1 tablet.       No current facility-administered medications for this visit.      No results found for: "WBC", "HGB", "HCT", "MCV", "PLT"     CMP  No results found for: "NA", "K", "CL", "CO2", "GLU", "BUN", "CREATININE", "CALCIUM", "PROT", "ALBUMIN", "BILITOT", "ALKPHOS", "AST", "ALT", "ANIONGAP", "ESTGFRAFRICA", "EGFRNONAA"       Assessment:       Problem List Items Addressed This Visit          Immunology/Multi System    Hypergammaglobulinemia       Oncology    Monoclonal paraproteinemia       Plan:       Discussed that we do have a mechanism for marrow with sedation here in [place] but due to limited availability preference is given to patients with difficult procedure due to anatomy/ high grade malignancy, cannot get in position for procedure comfortably with sedation due to back or hip issues, or psychiatric diagnosis that prevents ability to due procedure without sedation.  Also discussed increased risk of adverse events when procedure is completed with conscious sedation.    Discussed logistics of procedure " including positioning, biopsy site, collection of solid and liquid marrow. Discussed incidence of pain and experience of our proceduralists. Discussed possibly completing procedure in clinic using pre-procedure pain medication and anxiolytic. Discussed a  would be needed if marrow completed this way.    After discussion, patient wanted additional time to consider procedure. Would message us through portal with decision.

## 2024-08-16 ENCOUNTER — OFFICE VISIT (OUTPATIENT)
Dept: HEMATOLOGY/ONCOLOGY | Facility: CLINIC | Age: 59
End: 2024-08-16
Payer: MEDICARE

## 2024-08-16 VITALS
HEART RATE: 59 BPM | WEIGHT: 200.69 LBS | DIASTOLIC BLOOD PRESSURE: 62 MMHG | SYSTOLIC BLOOD PRESSURE: 90 MMHG | TEMPERATURE: 97 F | BODY MASS INDEX: 36.93 KG/M2 | RESPIRATION RATE: 18 BRPM | HEIGHT: 62 IN | OXYGEN SATURATION: 98 %

## 2024-08-16 DIAGNOSIS — C88.0 WALDENSTROM'S MACROGLOBULINEMIA: Primary | ICD-10-CM

## 2024-08-16 RX ORDER — LEVOTHYROXINE SODIUM 125 UG/1
1 TABLET ORAL DAILY
COMMUNITY

## 2024-08-16 RX ORDER — PREDNISOLONE ACETATE 10 MG/ML
2 SUSPENSION/ DROPS OPHTHALMIC
COMMUNITY
Start: 2024-07-01

## 2024-08-16 NOTE — PROGRESS NOTES
Chief complaint:   Chief Complaint   Patient presents with    No complaint         HPI: 59 y/o F w/ PMHx of SLE, glaucoma, hypothyroidism, HLD, GERD, OA and Waldenstrom Macroglobulinemia previously being followed at [place] by Dr Massey presented to [facility] to establish care.    She has never had a bone marrow biopsy or a lymph node biopsy. Diagnosed with SLE 17 years ago. Presenting symptoms were neuropathy. Diagnosed with WM , never had treatment    Colonoscopy , as per pt negative, Dr Boland. Follow up in 3 years    Interval History  Today, 2024, patient denies any acute concerns today.  She reports skin being scheduled for eye surgery tomorrow.  She denies any decreased appetite, weight loss, easy bruising, bleeding, burning pain in extremities, headaches, vision changes, shortness of breath, chest tightness, dizziness      Imagin24 US Left breast No suspicious abnormalities, small benign appearing nodules  2023 skeletal survey:  No suspicious lytic or sclerotic lesions.  Degenerative changes within the spine and bilateral knees.  21 screening MMG: BIRADS2  20 Screening MMG: BIRADS1  19: CT C/A/P: Slight increase of right axillary lymph nodes, the largest of which measures 8mm. Stable left axillary lymph nodes. Slight decrease in size of bilateral inguinal lymph nodes which remain normal by size criteria. No other significant changes.  2/15/19 CT C/A/P w/ IV contrast: borderline enlarged left axillary LN, slight increase in b/l inguinal LNs  18 CT C/A/P w/ IV contrast: small b/l axillary nodes, small borderline nodes in inguinal/pelvic areas  4/2/15 CT A/P w/ IV contrast: previously seen multiple prominent but non enlarged LNs along path of splenic artery and throughout splenic hilum have decreased in size and number since last exam. Previously seen mildly enlarged LN in between upper pole of left kidney and spleen measuring at least exam 1.7x1.4cm significantly  decreased in size now measuring 0.8x0.7cm    Labs:     08/06/2024 CMP unremarkable, CBC unremarkable, kappa light chain 292.1, lambda light chain 6.0, light chain ratio 48.6, IgG 2175, IgA 41, IgM 1052, M spike measuring 1.4 and 0.3 grams/deciliter, immunofixation shows biclonal IgG protein with kappa specificity And IgM with kappa specificity.  Serum viscosity 1.9.    4/24/24 CBC and CMP unremarkable, Free k light chains 250.9, FLLC 7.0, K/L ratio 35.84, IgG 1983, IgA 43, IgM 1141, gamma globulin 2.3, M spike gamma 1.2 and 0.3, serum viscosity 2.0    1/30/24 CMP unremarkable, CBC unremarkable, , serum viscosity 1.9, IgG 1859, IgA 48, IgM 1224, Gamma globulin 2.1, 2 M spikes gamma measuring 1.2, 0.3 total 4.1, A/G ratio .9, Free Kappa Light chains, 246.3, Free lambda light chains 6.7, K/L ratio 36.76    10/30/2023 CMP unremarkable, , WBC count 3.04, hemoglobin 13.6, MCV 89, platelet count 207, Beta-2 microglobulin 3.2, IgG 1602, IgA 57, IgM 1235, 2 M spikes measuring 1.2 grams/deciliter and 0.3 grams/deciliter, immunofixation shows biclonal IgG kappa protein.  Kappa light chain 244.3, lambda light chain 9.1, kappa to lambda ratio 26.8, serum viscosity 1.8.    07/21/2023 creatinine 0.7, albumin 3.5, calcium 9.0, LFTs within normal limits, ,WBC count 3.82, hemoglobin 13.2, MCV 88.5, platelet count 238, ANC 2.07, Beta-2 microglobulin 2.5, kappa light chain 259, lambda light chain 7, light chain ratio 37.10, urine immunofixation with IgG monoclonal protein kappa light chain, urine kappa to lambda ratio at 12.08, 2 M spikes measuring 1.4 and 0.3, IgM without no confirmed light chain association.      05/01/23: CMP unremarkable, creatinine 0.8, calcium 9.3, WBC count 3.81, hemoglobin 14.2, MCV 89.4, platelet count 241, ANC 2.02, Beta-2 microglobulin 2.6, kappa light chain  253.79, lambda light chain 8.10, light chain ratio 31.33, serum viscosity 1.29,  IgG 1675, IgM 993, IgA 60, SPEP/[place]  with m spike  of 0.42 grams/deciliter in beta region, [place]a with IgM kappa and IgG kappa biclonal spike. Upep positive for bence talbert proteins. PIPPA with monoclonal IgG heavy chain associated [place] light chain and excess monoclonal free kappa light chain. , UKappa LC 91.47, ULambda LC 4.73    02/01/2023:  CMP unremarkable, creatinine 0.8, calcium 9.3, WBC count 4.2, hemoglobin 13.6, MCV 90.1, platelet count 224, ANC 2.12, Beta-2 microglobulin 2.7, kappa light chain  240.5, lambda light chain 7.9, light chain ratio 30.21, serum viscosity 1.31.  SPEP/Pippa he with mm spike of 0.43 grams/deciliter, Pippa he with IgM kappa and IgG kappa biclonal spike.    12/20/2022:  Creatinine 0.75, albumin 3.6, calcium 9.1, alkaline phosphatase 68, total bili 1.1, AST 23, ALT 13, , folic acid 10.8, WBC count 3.2, hemoglobin 13.8, platelet count 233, ANC 1.63, beta 2 microglobulin 2.6, kappa free light chain 195.9, lambda free light chain 6.0, light chain ratio 32.65, serum viscosity 1.34, SPEP/Pippa he with 1.56 grams/deciliter and 0.5 grams/deciliter of monoclonal protein, with IgM type kappa and IgG type kappa biclonal protein.    09/26/22: CBC with WBC of 3.66 and ANC 1900, otherwise good. . Uric acid 7.3 (high). Kappa .62, Lambda LC 9.76, K/L ratio 23.12. Spep with M-spikes in the gamma region - 1.34 g/dL and 0.48 g/dL of the total 1.90 g/dL of the protein in the gamma region. Upep with faint band of IgG, Kappa LC 43.66, Lambda LC 3.41. Serum viscosity pending.     06/27/2022:  Creatinine 0.76, albumin 3.6, calcium 9.6, , folic acid 9.2, vitamin B12 538, WBC count 3.57, hemoglobin 13.6, MCV 88.9, platelet count 237, INR 1.1, kappa free light chain to 31.86, lambda free light chain 8.93, kappa to lambda free light chain ratio 25.96, IgG 1529, SPEP/[place]ipp[place] the with the M spike measuring 0.46 grams/deciliter in the beta region.  There is another M spike in the gamma region.  Collective monoclonal M spike measuring 2.05  grams/deciliter, immunofixation shows a palmar rising IgG type kappa and a palmar rising IgM type kappa biclonal protein.  UPEP is positive for monoclonal free kappa light chain, serum viscosity 1.48,    3/16/22 Cr 0.75, Alb 3.4, TP 8.2, , TSH 0.79, folic acid 12.27, WBC 4.05, Hgb 14.1, , ANC 2.31, PT 12.0, PTT 31, INR 1.0, IgG 1682, IgM 1266, IgA 63, SFLC ratio 28.90, kappa 215.63, lambda 7.46, collect M spike 2.13 g/dL IgG kappa and IgM kappa, UPEP  mg/day, Positive Bence Garcia protein. Urine PIPPA shows IgG heavy chain with associated kappa light chain excess monoclonal free kappa light chains, serum viscosity 1.46    1/5/22 Cr 0.78, Alb 3.5, TP 8.3, , TSH 11.3, folic acid 6.88, WBC 3.43, Hgb 13.9, , ANC 1.31, PT 12.1, INR 1.0, PTT 28, SFLC ratio 31.97, kappa 305.30, lambda 9.55, collect M spike 2.18 g/dL IgG kappa and IgM kappa    10/18/21 Cr 0.74 Alb 3.4 TP 8.2  TSH 0.22 Folate 9.19 WBC 4.23 Hg 13.6  INR 1.1, SFLC ratio 25.83, kappa 221, lambda 8.58 serum viscosity 1.67, collective m spike 2.28 IgG kappa and IgM kappa    7/12/21 serum viscosity 1.79 SPEP w/ PIPPA IgG 1946 IgA 57 IgM 1289 Abs kappa 229.66 Abs lambda 9.43 SFLC 24.35 1.32 g/dl, 0.96 g/dl IgG kappa IgM kappa 24hr UPEP 142 mg/day monoclonal free kappa and PIPPA with IgG heavy chain    4/19/21 serum viscosity 1.62 SPEP w/ PIPPA Igg 2115 IgM 1558 IgA 62 Abs kappa 273.9 Abs lambda 7.47 SFLC ratio 36.67 M spikes 2.55 g/dl biclonal IgG kappa and IgM kappa with additional band in IgM kappa INR 1.1 PT 12.8 PTT 28 Cr 0.75 Alb 3.4 TP 8.7  TSH 0.1031 folate 11.96 WBC 3.48 Hg 14  24hr Urine protein <156 mg/day    2/22/21 serum viscosity 1.75 SPEP w/ PIPPA IgG 1947 IgM 1250 IgA 56 Abs Kappa 219 Abs lambda 7.65 SFLC ratio 28.63 M spike 0.46 g/dl beta region, biclonal M spikes gamma region 1.31 g/dl and 1.05 g/dl IgG kappa and IgM kappa with additional faint band in IgG kappa, 24hr UPEP 168 mg/tera positive for  Bence Garcia free kappa with PIPPA showing IgG kappa, excess free kappa and IgM kappa  Cr 0.70, Alb 3.3 TP 8.4 Ca 9.0  TSH 0.03 Folic Acid 6.09 WBC 3.87, Hgb 13.7, , ANC 2.01    11/26/20 Serum viscosity 1.51 SPEP w/ PIPPA IgG 1780 IgM 1620 IgA 66 Abs kappa 241.49 Abs lambda 7.67 SFLC ratio 31.49 M spikes 1.17 g/dl and 1.01 g/dl IgG kappa and IgM kappa with additional faint band in IgG kappa, 24hr UPEP Protein 172mg/day, Bence Garcia positive monoclonal free kappa, PIPPA with monoclonal IgG heavy chain with associated kappa light chain and excess free kappa light chains  Cr 0.79 TP 8.6 Alb 3.4 Ca 9.5 AlkPhos 68 AST 19 ALT 14  TSH 0.0498 WBC 5.11 Hg 13.4 RBC 4.66 MCV 91.2 RDW 12.9  ANC 2.9 INR 1.1 PT 12.7 PTT 28    9/29/20 Total protein in UPEP 138mg/day, positive for Bence Garcia free kappa. PIPPA IgG monoclonal heavy chain with associated kappa light chain. SPEP M spike 0.49 g/dl in beta region, M spikes in gamma region collectively account for 2.15 g/dl, PIPPA shows IgG type kappa and polymerizing IgM type kappa biclonal proteins with an additional faint band in IgG kappa, Serum viscosity 1.70. Abs kappa 194 Abs lambda 6.51 SFLC ratio 29.93. INR 1, PT 11.9, PTT 30. Cr 0.77 TP 8.2 Alb 3.4 Ca 8.  WBC 3.33 Hg 13.7     6/2/20 Total protein in UPEP 185mg/day, positive for Bence Garcia free kappa. PIPPA IgG monoclonal heavy chain with associated kappa light chain. SPEP M spike 0.46 g/dl in beta region, M spikes in gamma region 2.15g/dl and 1.03 g/dl IgG kappa and polymerizing IgM kappa with additional faint band in IgG kappa. Serum viscosity 1.78. Abs kappa 185 Abs lambda 7.73 SFLC ratio 23.9. INR 1, PT 11.6. Cr 0.84 TP 8.4 Alb 3.5 Ca 9.01  WBC 3.62 Hg 13.3     2/13/20 SPEP w/ PIPPA IgA 82 IgM 1850 IgG 1680 M spike in beta region 0.52g/dl. M spikes in gamma region 1.08 g/dl and 1.04 g/dl IgG kappa and IgM kappa additional faint band IgG kappa serum viscosity 1.63  11/11/19 SFLC ratio  31.67, kappa 17.10, lambda 0.54, PIPPA shows IgM kappa and IgG kappa biclonal proteins serum viscosity 1.78    8/15/19 SIEP IgA 78, IgM 2310 (was 2430), IgG 1700 (was 1660, serum viscosity 1.81 ( was 1.69), CMP WNL except TP 8.8, WBC 3.14, HGB 13,7, HCT 41.9, MCV 89 PLT 211K, SKFLC 31/LFCL 0.77, K:L ratio 41  5/16/19: CBC and CMP unremarkable, coags WNL;     2/18/19 CBC and CMP WNL, LDH WNL, b2 microglobulin 2.5, serum viscosity 169, SPEP w/ M spikes in the gamma region 1.21 g/dl and 1.13 g/dl, suspicious broadening of the complement in the beta region. PIPPA pattern shows IgG type kappa and IgM type kappa biclonal proteins with an additional band in IgM kappa.    11/12/18 Hg 13.3 Kappa 10.8 lambda 0.3 SLFC ratio 29.19 Serum viscosity 1.8, , coags normal, b2 microglobulin 2.1, SPEP M spike in beta region 0.63 g/dl, 2 spikes in gamma region 1.23 g/dl and 1.14 g/dl IgG kappa and IgM kappa, UPEP +free kappa light chains and monoclonal IgG kappa    2/28/18 Hg 13.1 WBC 5.2   1/26/16 free kappa 23.3 ratio 28 viscosity 3.4, two M spikes 1g/dl and 0.9 g/dl IgM 2300  10/14/14 IgM 2496 SPEP M spike 2.2 g/dl    Path: none        Past Medical History:   Diagnosis Date    Glaucoma     Lupus     Personal history of colonic polyps 10/29/2019    Thyroid disease        Past Surgical History:   Procedure Laterality Date    BILATERAL TUBAL LIGATION      BREAST SURGERY  1986    Left breast ( cyst)    BUNIONECTOMY      CHOLECYSTECTOMY  October,2022    COLONOSCOPY W/ BIOPSIES AND POLYPECTOMY  10/29/2019    TUBAL LIGATION  1993       Family History   Problem Relation Name Age of Onset    Prostate cancer Father Victor Hugo Whittier     Cancer Father Victor Hugo Arturo     Breast cancer Sister Jayshree Clavelle     Cancer Sister Jayshree Clavkarri     Heart disease Brother Yury Whittier     Cancer Brother Yury Morris     Prostate cancer Brother      Arthritis Mother Sherrell Morris        Social History     Socioeconomic History    Marital status:     Tobacco Use    Smoking status: Never    Smokeless tobacco: Never   Substance and Sexual Activity    Alcohol use: Never    Drug use: Never    Sexual activity: Yes     Partners: Male     Birth control/protection: None     Social Determinants of Health     Financial Resource Strain: Low Risk  (6/4/2024)    Overall Financial Resource Strain (CARDIA)     Difficulty of Paying Living Expenses: Not very hard   Food Insecurity: No Food Insecurity (6/4/2024)    Hunger Vital Sign     Worried About Running Out of Food in the Last Year: Never true     Ran Out of Food in the Last Year: Never true   Physical Activity: Inactive (6/4/2024)    Exercise Vital Sign     Days of Exercise per Week: 1 day     Minutes of Exercise per Session: 0 min   Stress: No Stress Concern Present (6/4/2024)    [employer] - Occupational Stress Questionnaire     Feeling of Stress : Not at all   Housing Stability: Unknown (6/4/2024)    Housing Stability Vital Sign     Unable to Pay for Housing in the Last Year: No       Current Outpatient Medications   Medication Sig Dispense Refill    folic acid (FOLVITE) 1 MG tablet Take 1 tablet by mouth once daily.      latanoprost 0.005 % ophthalmic solution latanoprost 0.005 % eye drops      levothyroxine (SYNTHROID) 125 MCG tablet Take 1 tablet by mouth once daily.      rosuvastatin (CRESTOR) 40 MG Tab 1 tablet.      prednisoLONE acetate (PRED FORTE) 1 % DrpS Place 2 drops into the right eye. (Patient not taking: Reported on 8/16/2024)       No current facility-administered medications for this visit.       Review of patient's allergies indicates:   Allergen Reactions    Hydrocodone      Other reaction(s): .  Other reaction(s): irritability    Promethazine Other (See Comments)     Other reaction(s): jittery       Review of Systems     CONSTITUTIONAL: no fevers, no chills, no unintentional weight loss, + fatigue, no weakness  HEMATOLOGIC: no abnormal bleeding, no abnormal  bruising, no drenching night sweats  ONCOLOGIC: no new masses or lumps  HEENT: no vision loss or vision changes, no tinnitus or hearing loss, no nose bleeding, no dysphagia, no odynophagia  CVS: no chest pain, no palpitations, no dyspnea on exertion  RESP: no shortness of breath, no hemoptysis, no cough  BREAST: no nipple discharge, no breast tenderness, no breast masses on self breast examination  GI: no nausea, no vomiting, no diarrhea, no constipation, no melena, no hematochezia, no hematemesis, no abdominal pain, no increase in abdominal girth  : no dysuria, no hematuria, no discharge  GYN: no abnormal vaginal bleeding, no dyspareunia, no vaginal discharge  INTEGUMENT: no rashes, no abnormal bruising, no nail pitting, no hyperpigmentation  NEURO: no falls, no memory loss, no paresthesias or dysesthesias, no urofecal incontinence or retention, no loss of strength on any extremity  MSK: + back pain, no new joint pains but +pelvic pains, no joint swelling  PSYCH: no suicidal or homicidal ideation, no depression, no insomnia     Vitals:    08/16/24 1048   BP: 90/62   Pulse: (!) 59   Resp: 18   Temp: 97.4 °F (36.3 °C)       Assessment/Plan     # Waldenstrom macroglobulinemia, probable  She has biclonal IgM M spike and another IgG M spike, all kappa. Her serum viscosity is essentially normal. She has minimal to mild lymphadenopathy, without organomegaly, symptoms or any cytopenias. She has never had a bone marrow biopsy or a LN biopsy (she refuses). As no tissue is available, she does not meet criteria for Waldenstrom's or IgM Myeloma or Amyloidosis or LPL or any other lymphoma for that matter.   She still does not want to do bone marrow biopsy despite extensive discussions.  We will continue to pursue this in the future.  CT C/A/P on 11/19/19 showed Slight increase of right axillary lymph nodes, the largest of which measures 8mm. Stable left axillary lymph nodes. Slight decrease in size of bilateral inguinal lymph  nodes which remain normal by size criteria. No other significant changes. Imaging only as clinically indicated.      Plan  Patient was unable to get her bone marrow biopsy at [facility] in [place].    Reviewed labs, noted stable IgM, normal serum viscosity and stable M spike   Plan to follow-up in 3 months, labs the week prior.      A total of  20 minutes were spent in review of records, interpretation of test, coordination of care, discussion and counseling with the patient.          Portions of the record may have been created with voice recognition software. Occasional wrong-word or sound-a-like substitutions may have occurred due to the inherent limitations of voice recognition software.

## 2024-11-15 ENCOUNTER — OFFICE VISIT (OUTPATIENT)
Dept: HEMATOLOGY/ONCOLOGY | Facility: CLINIC | Age: 59
End: 2024-11-15
Payer: MEDICARE

## 2024-11-15 VITALS
BODY MASS INDEX: 30.86 KG/M2 | HEIGHT: 67 IN | SYSTOLIC BLOOD PRESSURE: 151 MMHG | HEART RATE: 67 BPM | WEIGHT: 196.63 LBS | OXYGEN SATURATION: 97 % | TEMPERATURE: 98 F | DIASTOLIC BLOOD PRESSURE: 94 MMHG | RESPIRATION RATE: 18 BRPM

## 2024-11-15 DIAGNOSIS — C88.00 WALDENSTROM'S MACROGLOBULINEMIA: Primary | ICD-10-CM

## 2024-11-15 DIAGNOSIS — D89.2 HYPERGAMMAGLOBULINEMIA: ICD-10-CM

## 2024-11-15 DIAGNOSIS — D47.2 MONOCLONAL PARAPROTEINEMIA: ICD-10-CM

## 2024-11-15 DIAGNOSIS — M32.9 SYSTEMIC LUPUS ERYTHEMATOSUS, UNSPECIFIED SLE TYPE, UNSPECIFIED ORGAN INVOLVEMENT STATUS: ICD-10-CM

## 2024-11-15 NOTE — PROGRESS NOTES
Chief complaint:   Chief Complaint   Patient presents with    Results         HPI: 59 y/o F w/ PMHx of SLE, glaucoma, hypothyroidism, HLD, GERD, OA and Waldenstrom Macroglobulinemia previously being followed at [facility] by Dr Massey presented to [facility] to establish care.    She has never had a bone marrow biopsy or a lymph node biopsy. Diagnosed with SLE 17 years ago. Presenting symptoms were neuropathy. Diagnosed with WM , never had treatment    Colonoscopy , as per pt negative, Dr Boland. Follow up in 3 years    Interval History  Today, 11/15/2024, patient denies any acute concerns today. She denies any decreased appetite, weight loss, easy bruising, bleeding, burning pain in extremities, headaches, vision changes, shortness of breath, chest tightness, dizziness    Imagin24 US Left breast No suspicious abnormalities, small benign appearing nodules  2023 skeletal survey:  No suspicious lytic or sclerotic lesions.  Degenerative changes within the spine and bilateral knees.  21 screening MMG: BIRADS2  20 Screening MMG: BIRADS1  19: CT C/A/P: Slight increase of right axillary lymph nodes, the largest of which measures 8mm. Stable left axillary lymph nodes. Slight decrease in size of bilateral inguinal lymph nodes which remain normal by size criteria. No other significant changes.  2/15/19 CT C/A/P w/ IV contrast: borderline enlarged left axillary LN, slight increase in b/l inguinal LNs  18 CT C/A/P w/ IV contrast: small b/l axillary nodes, small borderline nodes in inguinal/pelvic areas  4/2/15 CT A/P w/ IV contrast: previously seen multiple prominent but non enlarged LNs along path of splenic artery and throughout splenic hilum have decreased in size and number since last exam. Previously seen mildly enlarged LN in between upper pole of left kidney and spleen measuring at least exam 1.7x1.4cm significantly decreased in size now measuring 0.8x0.7cm    Labs:   2024 CMP  unremarkable, CBC unremarkable, kappa light chain 274.6, lambda light chain 5.0, light chain ratio 54.92, IgG 2071, IgA 52, IgM 1048, M spike measuring 1.4 and 0.2 grams/deciliter, immunofixation shows biclonal IgG protein with kappa specificity And IgM with kappa specificity.  Serum viscosity NR    08/06/2024 CMP unremarkable, CBC unremarkable, kappa light chain 292.1, lambda light chain 6.0, light chain ratio 48.6, IgG 2175, IgA 41, IgM 1052, M spike measuring 1.4 and 0.3 grams/deciliter, immunofixation shows biclonal IgG protein with kappa specificity And IgM with kappa specificity.  Serum viscosity 1.9.    4/24/24 CBC and CMP unremarkable, Free k light chains 250.9, FLLC 7.0, K/L ratio 35.84, IgG 1983, IgA 43, IgM 1141, gamma globulin 2.3, M spike gamma 1.2 and 0.3, serum viscosity 2.0    1/30/24 CMP unremarkable, CBC unremarkable, , serum viscosity 1.9, IgG 1859, IgA 48, IgM 1224, Gamma globulin 2.1, 2 M spikes gamma measuring 1.2, 0.3 total 4.1, A/G ratio .9, Free Kappa Light chains, 246.3, Free lambda light chains 6.7, K/L ratio 36.76    10/30/2023 CMP unremarkable, , WBC count 3.04, hemoglobin 13.6, MCV 89, platelet count 207, Beta-2 microglobulin 3.2, IgG 1602, IgA 57, IgM 1235, 2 M spikes measuring 1.2 grams/deciliter and 0.3 grams/deciliter, immunofixation shows biclonal IgG kappa protein.  Kappa light chain 244.3, lambda light chain 9.1, kappa to lambda ratio 26.8, serum viscosity 1.8.    07/21/2023 creatinine 0.7, albumin 3.5, calcium 9.0, LFTs within normal limits, ,WBC count 3.82, hemoglobin 13.2, MCV 88.5, platelet count 238, ANC 2.07, Beta-2 microglobulin 2.5, kappa light chain 259, lambda light chain 7, light chain ratio 37.10, urine immunofixation with IgG monoclonal protein kappa light chain, urine kappa to lambda ratio at 12.08, 2 M spikes measuring 1.4 and 0.3, IgM without no confirmed light chain association.      05/01/23: CMP unremarkable, creatinine 0.8, calcium 9.3,  WBC count 3.81, hemoglobin 14.2, MCV 89.4, platelet count 241, ANC 2.02, Beta-2 microglobulin 2.6, kappa light chain  253.79, lambda light chain 8.10, light chain ratio 31.33, serum viscosity 1.29,  IgG 1675, IgM 993, IgA 60, SPEP/Pippa  with m spike of 0.42 grams/deciliter in beta region, Pippa with IgM kappa and IgG kappa biclonal spike. Upep positive for bence talbert proteins. PIPPA with monoclonal IgG heavy chain associated [place] light chain and excess monoclonal free kappa light chain. , UKappa LC 91.47, ULambda LC 4.73    02/01/2023:  CMP unremarkable, creatinine 0.8, calcium 9.3, WBC count 4.2, hemoglobin 13.6, MCV 90.1, platelet count 224, ANC 2.12, Beta-2 microglobulin 2.7, kappa light chain  240.5, lambda light chain 7.9, light chain ratio 30.21, serum viscosity 1.31.  SPEP/Pippa he with mm spike of 0.43 grams/deciliter, Pippa he with IgM kappa and IgG kappa biclonal spike.    12/20/2022:  Creatinine 0.75, albumin 3.6, calcium 9.1, alkaline phosphatase 68, total bili 1.1, AST 23, ALT 13, , folic acid 10.8, WBC count 3.2, hemoglobin 13.8, platelet count 233, ANC 1.63, beta 2 microglobulin 2.6, kappa free light chain 195.9, lambda free light chain 6.0, light chain ratio 32.65, serum viscosity 1.34, SPEP/Pippa he with 1.56 grams/deciliter and 0.5 grams/deciliter of monoclonal protein, with IgM type kappa and IgG type kappa biclonal protein.    09/26/22: CBC with WBC of 3.66 and ANC 1900, otherwise good. . Uric acid 7.3 (high). Kappa .62, Lambda LC 9.76, K/L ratio 23.12. Spep with M-spikes in the gamma region - 1.34 g/dL and 0.48 g/dL of the total 1.90 g/dL of the protein in the gamma region. Upep with faint band of IgG, Kappa LC 43.66, Lambda LC 3.41. Serum viscosity pending.     06/27/2022:  Creatinine 0.76, albumin 3.6, calcium 9.6, , folic acid 9.2, vitamin B12 538, WBC count 3.57, hemoglobin 13.6, MCV 88.9, platelet count 237, INR 1.1, kappa free light chain to 31.86, lambda free light chain  8.93, kappa to lambda free light chain ratio 25.96, IgG 1529, SPEP/Pippa the with the M spike measuring 0.46 grams/deciliter in the beta region.  There is another M spike in the gamma region.  Collective monoclonal M spike measuring 2.05 grams/deciliter, immunofixation shows a palmar rising IgG type kappa and a palmar rising IgM type kappa biclonal protein.  UPEP is positive for monoclonal free kappa light chain, serum viscosity 1.48,    3/16/22 Cr 0.75, Alb 3.4, TP 8.2, , TSH 0.79, folic acid 12.27, WBC 4.05, Hgb 14.1, , ANC 2.31, PT 12.0, PTT 31, INR 1.0, IgG 1682, IgM 1266, IgA 63, SFLC ratio 28.90, kappa 215.63, lambda 7.46, collect M spike 2.13 g/dL IgG kappa and IgM kappa, UPEP  mg/day, Positive Bence Garcia protein. Urine PIPPA shows IgG heavy chain with associated kappa light chain excess monoclonal free kappa light chains, serum viscosity 1.46    1/5/22 Cr 0.78, Alb 3.5, TP 8.3, , TSH 11.3, folic acid 6.88, WBC 3.43, Hgb 13.9, , ANC 1.31, PT 12.1, INR 1.0, PTT 28, SFLC ratio 31.97, kappa 305.30, lambda 9.55, collect M spike 2.18 g/dL IgG kappa and IgM kappa    10/18/21 Cr 0.74 Alb 3.4 TP 8.2  TSH 0.22 Folate 9.19 WBC 4.23 Hg 13.6  INR 1.1, SFLC ratio 25.83, kappa 221, lambda 8.58 serum viscosity 1.67, collective m spike 2.28 IgG kappa and IgM kappa    7/12/21 serum viscosity 1.79 SPEP w/ PIPPA IgG 1946 IgA 57 IgM 1289 Abs kappa 229.66 Abs lambda 9.43 SFLC 24.35 1.32 g/dl, 0.96 g/dl IgG kappa IgM kappa 24hr UPEP 142 mg/day monoclonal free kappa and PIPPA with IgG heavy chain    4/19/21 serum viscosity 1.62 SPEP w/ PIPPA Igg 2115 IgM 1558 IgA 62 Abs kappa 273.9 Abs lambda 7.47 SFLC ratio 36.67 M spikes 2.55 g/dl biclonal IgG kappa and IgM kappa with additional band in IgM kappa INR 1.1 PT 12.8 PTT 28 Cr 0.75 Alb 3.4 TP 8.7  TSH 0.1031 folate 11.96 WBC 3.48 Hg 14  24hr Urine protein <156 mg/day    2/22/21 serum viscosity 1.75 SPEP w/ PIPPA IgG 1947 IgM 1250 IgA  56 Abs Kappa 219 Abs lambda 7.65 SFLC ratio 28.63 M spike 0.46 g/dl beta region, biclonal M spikes gamma region 1.31 g/dl and 1.05 g/dl IgG kappa and IgM kappa with additional faint band in IgG kappa, 24hr UPEP 168 mg/tera positive for Bence Garcia free kappa with PIPPA showing IgG kappa, excess free kappa and IgM kappa  Cr 0.70, Alb 3.3 TP 8.4 Ca 9.0  TSH 0.03 Folic Acid 6.09 WBC 3.87, Hgb 13.7, , ANC 2.01    11/26/20 Serum viscosity 1.51 SPEP w/ PIPPA IgG 1780 IgM 1620 IgA 66 Abs kappa 241.49 Abs lambda 7.67 SFLC ratio 31.49 M spikes 1.17 g/dl and 1.01 g/dl IgG kappa and IgM kappa with additional faint band in IgG kappa, 24hr UPEP Protein 172mg/day, Bence Garcia positive monoclonal free kappa, PIPPA with monoclonal IgG heavy chain with associated kappa light chain and excess free kappa light chains  Cr 0.79 TP 8.6 Alb 3.4 Ca 9.5 AlkPhos 68 AST 19 ALT 14  TSH 0.0498 WBC 5.11 Hg 13.4 RBC 4.66 MCV 91.2 RDW 12.9  ANC 2.9 INR 1.1 PT 12.7 PTT 28    9/29/20 Total protein in UPEP 138mg/day, positive for Bence Garcia free kappa. PIPPA IgG monoclonal heavy chain with associated kappa light chain. SPEP M spike 0.49 g/dl in beta region, M spikes in gamma region collectively account for 2.15 g/dl, PIPPA shows IgG type kappa and polymerizing IgM type kappa biclonal proteins with an additional faint band in IgG kappa, Serum viscosity 1.70. Abs kappa 194 Abs lambda 6.51 SFLC ratio 29.93. INR 1, PT 11.9, PTT 30. Cr 0.77 TP 8.2 Alb 3.4 Ca 8.  WBC 3.33 Hg 13.7     6/2/20 Total protein in UPEP 185mg/day, positive for Bence Garcia free kappa. PIPPA IgG monoclonal heavy chain with associated kappa light chain. SPEP M spike 0.46 g/dl in beta region, M spikes in gamma region 2.15g/dl and 1.03 g/dl IgG kappa and polymerizing IgM kappa with additional faint band in IgG kappa. Serum viscosity 1.78. Abs kappa 185 Abs lambda 7.73 SFLC ratio 23.9. INR 1, PT 11.6. Cr 0.84 TP 8.4 Alb 3.5 Ca 9.01  WBC 3.62 Hg 13.3      2/13/20 SPEP w/ PIPPA IgA 82 IgM 1850 IgG 1680 M spike in beta region 0.52g/dl. M spikes in gamma region 1.08 g/dl and 1.04 g/dl IgG kappa and IgM kappa additional faint band IgG kappa serum viscosity 1.63  11/11/19 SFLC ratio 31.67, kappa 17.10, lambda 0.54, PIPPA shows IgM kappa and IgG kappa biclonal proteins serum viscosity 1.78    8/15/19 SIEP IgA 78, IgM 2310 (was 2430), IgG 1700 (was 1660, serum viscosity 1.81 ( was 1.69), CMP WNL except TP 8.8, WBC 3.14, HGB 13,7, HCT 41.9, MCV 89 PLT 211K, SKFLC 31/LFCL 0.77, K:L ratio 41  5/16/19: CBC and CMP unremarkable, coags WNL;     2/18/19 CBC and CMP WNL, LDH WNL, b2 microglobulin 2.5, serum viscosity 169, SPEP w/ M spikes in the gamma region 1.21 g/dl and 1.13 g/dl, suspicious broadening of the complement in the beta region. PIPPA pattern shows IgG type kappa and IgM type kappa biclonal proteins with an additional band in IgM kappa.    11/12/18 Hg 13.3 Kappa 10.8 lambda 0.3 SLFC ratio 29.19 Serum viscosity 1.8, , coags normal, b2 microglobulin 2.1, SPEP M spike in beta region 0.63 g/dl, 2 spikes in gamma region 1.23 g/dl and 1.14 g/dl IgG kappa and IgM kappa, UPEP +free kappa light chains and monoclonal IgG kappa    2/28/18 Hg 13.1 WBC 5.2   1/26/16 free kappa 23.3 ratio 28 viscosity 3.4, two M spikes 1g/dl and 0.9 g/dl IgM 2300  10/14/14 IgM 2496 SPEP M spike 2.2 g/dl    Path: none        Past Medical History:   Diagnosis Date    Glaucoma     Lupus     Personal history of colonic polyps 10/29/2019    Thyroid disease        Past Surgical History:   Procedure Laterality Date    BILATERAL TUBAL LIGATION      BREAST SURGERY  1986    Left breast ( cyst)    BUNIONECTOMY      CATARACT EXTRACTION, BILATERAL Bilateral 08/2024    CHOLECYSTECTOMY  October,2022    COLONOSCOPY W/ BIOPSIES AND POLYPECTOMY  10/29/2019    TUBAL LIGATION  1993       Family History   Problem Relation Name Age of Onset    Prostate cancer Father Victor Hugo Morris     Cancer Father  5 Victor Hugo Morris     Breast cancer Sister Jayshree Reid     Cancer Sister Jayshree Reid     Heart disease Brother Yury Morris     Cancer Brother Yury oMrris     Prostate cancer Brother      Arthritis Mother Sherrell Morris        Social History     Socioeconomic History    Marital status:    Tobacco Use    Smoking status: Never    Smokeless tobacco: Never   Substance and Sexual Activity    Alcohol use: Never    Drug use: Never    Sexual activity: Yes     Partners: Male     Birth control/protection: None     Social Drivers of Health     Financial Resource Strain: Low Risk  (6/4/2024)    Overall Financial Resource Strain (CARDIA)     Difficulty of Paying Living Expenses: Not very hard   Food Insecurity: No Food Insecurity (6/4/2024)    Hunger Vital Sign     Worried About Running Out of Food in the Last Year: Never true     Ran Out of Food in the Last Year: Never true   Physical Activity: Inactive (6/4/2024)    Exercise Vital Sign     Days of Exercise per Week: 1 day     Minutes of Exercise per Session: 0 min   Stress: No Stress Concern Present (6/4/2024)    [employer] - Occupational Stress Questionnaire     Feeling of Stress : Not at all   Housing Stability: Unknown (6/4/2024)    Housing Stability Vital Sign     Unable to Pay for Housing in the Last Year: No       Current Outpatient Medications   Medication Sig Dispense Refill    folic acid (FOLVITE) 1 MG tablet Take 1 tablet by mouth once daily.      latanoprost 0.005 % ophthalmic solution latanoprost 0.005 % eye drops      levothyroxine (SYNTHROID) 125 MCG tablet Take 1 tablet by mouth once daily.      rosuvastatin (CRESTOR) 40 MG Tab 1 tablet.      prednisoLONE acetate (PRED FORTE) 1 % DrpS Place 2 drops into the right eye. (Patient not taking: Reported on 11/15/2024)       No current facility-administered medications for this visit.       Review of patient's allergies indicates:   Allergen Reactions    Hydrocodone      Other  reaction(s): .  Other reaction(s): irritability    Promethazine Other (See Comments)     Other reaction(s): jittery       Review of Systems     CONSTITUTIONAL: no fevers, no chills, no unintentional weight loss, + fatigue, no weakness  HEMATOLOGIC: no abnormal bleeding, no abnormal bruising, no drenching night sweats  ONCOLOGIC: no new masses or lumps  HEENT: no vision loss or vision changes, no tinnitus or hearing loss, no nose bleeding, no dysphagia, no odynophagia  CVS: no chest pain, no palpitations, no dyspnea on exertion  RESP: no shortness of breath, no hemoptysis, no cough  BREAST: no nipple discharge, no breast tenderness, no breast masses on self breast examination  GI: no nausea, no vomiting, no diarrhea, no constipation, no melena, no hematochezia, no hematemesis, no abdominal pain, no increase in abdominal girth  : no dysuria, no hematuria, no discharge  GYN: no abnormal vaginal bleeding, no dyspareunia, no vaginal discharge  INTEGUMENT: no rashes, no abnormal bruising, no nail pitting, no hyperpigmentation  NEURO: no falls, no memory loss, no paresthesias or dysesthesias, no urofecal incontinence or retention, no loss of strength on any extremity  MSK: + back pain, no new joint pains but +pelvic pains, no joint swelling  PSYCH: no suicidal or homicidal ideation, no depression, no insomnia     Vitals:    11/15/24 0846   BP: (!) 151/94   Pulse: 67   Resp: 18   Temp: 97.8 °F (36.6 °C)         Assessment/Plan     # Waldenstrom macroglobulinemia, probable  She has biclonal IgM M spike and another IgG M spike, all kappa. Her serum viscosity is essentially normal. She has minimal to mild lymphadenopathy, without organomegaly, symptoms or any cytopenias. She has never had a bone marrow biopsy or a LN biopsy (she refuses). As no tissue is available, she does not meet criteria for Waldenstrom's or IgM Myeloma or Amyloidosis or LPL or any other lymphoma for that matter.   She still does not want to do bone  marrow biopsy despite extensive discussions.  We will continue to pursue this in the future.  CT C/A/P on 11/19/19 showed Slight increase of right axillary lymph nodes, the largest of which measures 8mm. Stable left axillary lymph nodes. Slight decrease in size of bilateral inguinal lymph nodes which remain normal by size criteria. No other significant changes. Imaging only as clinically indicated.      Plan.    Reviewed labs, noted stable IgM and stable M spike   Patient was unable to get Bone marrow biopsy done in [place]  Plan to follow-up in 3 months, labs the week prior.      Lindsey Springer FNP-C

## 2025-02-27 NOTE — PROGRESS NOTES
Chief complaint:   Results    HPI: 59 y/o F w/ PMHx of SLE, glaucoma, hypothyroidism, HLD, GERD, OA and Waldenstrom Macroglobulinemia previously being followed at [place] by Dr Massey presented to [facility] to establish care.    She has never had a bone marrow biopsy or a lymph node biopsy. Diagnosed with SLE 17 years ago. Presenting symptoms were neuropathy. Diagnosed with WM , never had treatment    Colonoscopy , as per pt negative, Dr Boland. Follow up in 3 years    Interval History  Today, 25, patient denies any acute concerns today. She denies any decreased appetite, weight loss, easy bruising, bleeding, burning pain in extremities, headaches, vision changes, shortness of breath, chest tightness, dizziness    Imagin24 US Left breast No suspicious abnormalities, small benign appearing nodules  2023 skeletal survey:  No suspicious lytic or sclerotic lesions.  Degenerative changes within the spine and bilateral knees.  21 screening MMG: BIRADS2  20 Screening MMG: BIRADS1  19: CT C/A/P: Slight increase of right axillary lymph nodes, the largest of which measures 8mm. Stable left axillary lymph nodes. Slight decrease in size of bilateral inguinal lymph nodes which remain normal by size criteria. No other significant changes.  2/15/19 CT C/A/P w/ IV contrast: borderline enlarged left axillary LN, slight increase in b/l inguinal LNs  18 CT C/A/P w/ IV contrast: small b/l axillary nodes, small borderline nodes in inguinal/pelvic areas  4/2/15 CT A/P w/ IV contrast: previously seen multiple prominent but non enlarged LNs along path of splenic artery and throughout splenic hilum have decreased in size and number since last exam. Previously seen mildly enlarged LN in between upper pole of left kidney and spleen measuring at least exam 1.7x1.4cm significantly decreased in size now measuring 0.8x0.7cm    Labs:   25 CMP unremarkable, CBC unremarkable, kappa light chain  279.4, lambda light chain 7.4, light chain ratio 37.76, IgG 2130, IgA 39, IgM 961, M spike measuring 1.3 and 0.2 grams/deciliter, immunofixation shows biclonal IgG protein with kappa specificity And IgM with kappa specificity.  Serum viscosity 1.7    11/01/2024 CMP unremarkable, CBC unremarkable, kappa light chain 274.6, lambda light chain 5.0, light chain ratio 54.92, IgG 2071, IgA 52, IgM 1048, M spike measuring 1.4 and 0.2 grams/deciliter, immunofixation shows biclonal IgG protein with kappa specificity And IgM with kappa specificity.  Serum viscosity NR    08/06/2024 CMP unremarkable, CBC unremarkable, kappa light chain 292.1, lambda light chain 6.0, light chain ratio 48.6, IgG 2175, IgA 41, IgM 1052, M spike measuring 1.4 and 0.3 grams/deciliter, immunofixation shows biclonal IgG protein with kappa specificity And IgM with kappa specificity.  Serum viscosity 1.9.    4/24/24 CBC and CMP unremarkable, Free k light chains 250.9, FLLC 7.0, K/L ratio 35.84, IgG 1983, IgA 43, IgM 1141, gamma globulin 2.3, M spike gamma 1.2 and 0.3, serum viscosity 2.0    1/30/24 CMP unremarkable, CBC unremarkable, , serum viscosity 1.9, IgG 1859, IgA 48, IgM 1224, Gamma globulin 2.1, 2 M spikes gamma measuring 1.2, 0.3 total 4.1, A/G ratio .9, Free Kappa Light chains, 246.3, Free lambda light chains 6.7, K/L ratio 36.76    10/30/2023 CMP unremarkable, , WBC count 3.04, hemoglobin 13.6, MCV 89, platelet count 207, Beta-2 microglobulin 3.2, IgG 1602, IgA 57, IgM 1235, 2 M spikes measuring 1.2 grams/deciliter and 0.3 grams/deciliter, immunofixation shows biclonal IgG kappa protein.  Kappa light chain 244.3, lambda light chain 9.1, kappa to lambda ratio 26.8, serum viscosity 1.8.    07/21/2023 creatinine 0.7, albumin 3.5, calcium 9.0, LFTs within normal limits, ,WBC count 3.82, hemoglobin 13.2, MCV 88.5, platelet count 238, ANC 2.07, Beta-2 microglobulin 2.5, kappa light chain 259, lambda light chain 7, light chain  ratio 37.10, urine immunofixation with IgG monoclonal protein kappa light chain, urine kappa to lambda ratio at 12.08, 2 M spikes measuring 1.4 and 0.3, IgM without no confirmed light chain association.      05/01/23: CMP unremarkable, creatinine 0.8, calcium 9.3, WBC count 3.81, hemoglobin 14.2, MCV 89.4, platelet count 241, ANC 2.02, Beta-2 microglobulin 2.6, kappa light chain  253.79, lambda light chain 8.10, light chain ratio 31.33, serum viscosity 1.29,  IgG 1675, IgM 993, IgA 60, SPEP/Pippa  with m spike of 0.42 grams/deciliter in beta region, Pippa with IgM kappa and IgG kappa biclonal spike. Upep positive for bence talbert proteins. PIPPA with monoclonal IgG heavy chain associated [place] light chain and excess monoclonal free kappa light chain. , UKappa LC 91.47, ULambda LC 4.73    02/01/2023:  CMP unremarkable, creatinine 0.8, calcium 9.3, WBC count 4.2, hemoglobin 13.6, MCV 90.1, platelet count 224, ANC 2.12, Beta-2 microglobulin 2.7, kappa light chain  240.5, lambda light chain 7.9, light chain ratio 30.21, serum viscosity 1.31.  SPEP/Pippa he with mm spike of 0.43 grams/deciliter, Pippa he with IgM kappa and IgG kappa biclonal spike.    12/20/2022:  Creatinine 0.75, albumin 3.6, calcium 9.1, alkaline phosphatase 68, total bili 1.1, AST 23, ALT 13, , folic acid 10.8, WBC count 3.2, hemoglobin 13.8, platelet count 233, ANC 1.63, beta 2 microglobulin 2.6, kappa free light chain 195.9, lambda free light chain 6.0, light chain ratio 32.65, serum viscosity 1.34, SPEP/Pippa he with 1.56 grams/deciliter and 0.5 grams/deciliter of monoclonal protein, with IgM type kappa and IgG type kappa biclonal protein.    09/26/22: CBC with WBC of 3.66 and ANC 1900, otherwise good. . Uric acid 7.3 (high). Kappa .62, Lambda LC 9.76, K/L ratio 23.12. Spep with M-spikes in the gamma region - 1.34 g/dL and 0.48 g/dL of the total 1.90 g/dL of the protein in the gamma region. Upep with faint band of IgG, Kappa LC 43.66, Lambda  LC 3.41. Serum viscosity pending.     06/27/2022:  Creatinine 0.76, albumin 3.6, calcium 9.6, , folic acid 9.2, vitamin B12 538, WBC count 3.57, hemoglobin 13.6, MCV 88.9, platelet count 237, INR 1.1, kappa free light chain to 31.86, lambda free light chain 8.93, kappa to lambda free light chain ratio 25.96, IgG 1529, SPEP/Pippa the with the M spike measuring 0.46 grams/deciliter in the beta region.  There is another M spike in the gamma region.  Collective monoclonal M spike measuring 2.05 grams/deciliter, immunofixation shows a palmar rising IgG type kappa and a palmar rising IgM type kappa biclonal protein.  UPEP is positive for monoclonal free kappa light chain, serum viscosity 1.48,    3/16/22 Cr 0.75, Alb 3.4, TP 8.2, , TSH 0.79, folic acid 12.27, WBC 4.05, Hgb 14.1, , ANC 2.31, PT 12.0, PTT 31, INR 1.0, IgG 1682, IgM 1266, IgA 63, SFLC ratio 28.90, kappa 215.63, lambda 7.46, collect M spike 2.13 g/dL IgG kappa and IgM kappa, UPEP  mg/day, Positive Bence Garcia protein. Urine PIPPA shows IgG heavy chain with associated kappa light chain excess monoclonal free kappa light chains, serum viscosity 1.46    1/5/22 Cr 0.78, Alb 3.5, TP 8.3, , TSH 11.3, folic acid 6.88, WBC 3.43, Hgb 13.9, , ANC 1.31, PT 12.1, INR 1.0, PTT 28, SFLC ratio 31.97, kappa 305.30, lambda 9.55, collect M spike 2.18 g/dL IgG kappa and IgM kappa    10/18/21 Cr 0.74 Alb 3.4 TP 8.2  TSH 0.22 Folate 9.19 WBC 4.23 Hg 13.6  INR 1.1, SFLC ratio 25.83, kappa 221, lambda 8.58 serum viscosity 1.67, collective m spike 2.28 IgG kappa and IgM kappa    7/12/21 serum viscosity 1.79 SPEP w/ PIPPA IgG 1946 IgA 57 IgM 1289 Abs kappa 229.66 Abs lambda 9.43 SFLC 24.35 1.32 g/dl, 0.96 g/dl IgG kappa IgM kappa 24hr UPEP 142 mg/day monoclonal free kappa and PIPPA with IgG heavy chain    4/19/21 serum viscosity 1.62 SPEP w/ PIPPA Igg 2115 IgM 1558 IgA 62 Abs kappa 273.9 Abs lambda 7.47 SFLC ratio 36.67 M spikes 2.55 g/dl  biclonal IgG kappa and IgM kappa with additional band in IgM kappa INR 1.1 PT 12.8 PTT 28 Cr 0.75 Alb 3.4 TP 8.7  TSH 0.1031 folate 11.96 WBC 3.48 Hg 14  24hr Urine protein <156 mg/day    2/22/21 serum viscosity 1.75 SPEP w/ PIPPA IgG 1947 IgM 1250 IgA 56 Abs Kappa 219 Abs lambda 7.65 SFLC ratio 28.63 M spike 0.46 g/dl beta region, biclonal M spikes gamma region 1.31 g/dl and 1.05 g/dl IgG kappa and IgM kappa with additional faint band in IgG kappa, 24hr UPEP 168 mg/tera positive for Bence Garcia free kappa with PIPPA showing IgG kappa, excess free kappa and IgM kappa  Cr 0.70, Alb 3.3 TP 8.4 Ca 9.0  TSH 0.03 Folic Acid 6.09 WBC 3.87, Hgb 13.7, , ANC 2.01    11/26/20 Serum viscosity 1.51 SPEP w/ PIPPA IgG 1780 IgM 1620 IgA 66 Abs kappa 241.49 Abs lambda 7.67 SFLC ratio 31.49 M spikes 1.17 g/dl and 1.01 g/dl IgG kappa and IgM kappa with additional faint band in IgG kappa, 24hr UPEP Protein 172mg/day, Bence Garcia positive monoclonal free kappa, PIPPA with monoclonal IgG heavy chain with associated kappa light chain and excess free kappa light chains  Cr 0.79 TP 8.6 Alb 3.4 Ca 9.5 AlkPhos 68 AST 19 ALT 14  TSH 0.0498 WBC 5.11 Hg 13.4 RBC 4.66 MCV 91.2 RDW 12.9  ANC 2.9 INR 1.1 PT 12.7 PTT 28    9/29/20 Total protein in UPEP 138mg/day, positive for Bence Garcia free kappa. PIPPA IgG monoclonal heavy chain with associated kappa light chain. SPEP M spike 0.49 g/dl in beta region, M spikes in gamma region collectively account for 2.15 g/dl, PIPPA shows IgG type kappa and polymerizing IgM type kappa biclonal proteins with an additional faint band in IgG kappa, Serum viscosity 1.70. Abs kappa 194 Abs lambda 6.51 SFLC ratio 29.93. INR 1, PT 11.9, PTT 30. Cr 0.77 TP 8.2 Alb 3.4 Ca 8.  WBC 3.33 Hg 13.7     6/2/20 Total protein in UPEP 185mg/day, positive for Bence Garcia free kappa. PIPPA IgG monoclonal heavy chain with associated kappa light chain. SPEP M spike 0.46 g/dl in beta region, M  spikes in gamma region 2.15g/dl and 1.03 g/dl IgG kappa and polymerizing IgM kappa with additional faint band in IgG kappa. Serum viscosity 1.78. Abs kappa 185 Abs lambda 7.73 SFLC ratio 23.9. INR 1, PT 11.6. Cr 0.84 TP 8.4 Alb 3.5 Ca 9.01  WBC 3.62 Hg 13.3     2/13/20 SPEP w/ PIPPA IgA 82 IgM 1850 IgG 1680 M spike in beta region 0.52g/dl. M spikes in gamma region 1.08 g/dl and 1.04 g/dl IgG kappa and IgM kappa additional faint band IgG kappa serum viscosity 1.63  11/11/19 SFLC ratio 31.67, kappa 17.10, lambda 0.54, PIPPA shows IgM kappa and IgG kappa biclonal proteins serum viscosity 1.78    8/15/19 SIEP IgA 78, IgM 2310 (was 2430), IgG 1700 (was 1660, serum viscosity 1.81 ( was 1.69), CMP WNL except TP 8.8, WBC 3.14, HGB 13,7, HCT 41.9, MCV 89 PLT 211K, SKFLC 31/LFCL 0.77, K:L ratio 41  5/16/19: CBC and CMP unremarkable, coags WNL;     2/18/19 CBC and CMP WNL, LDH WNL, b2 microglobulin 2.5, serum viscosity 169, SPEP w/ M spikes in the gamma region 1.21 g/dl and 1.13 g/dl, suspicious broadening of the complement in the beta region. PIPPA pattern shows IgG type kappa and IgM type kappa biclonal proteins with an additional band in IgM kappa.    11/12/18 Hg 13.3 Kappa 10.8 lambda 0.3 SLFC ratio 29.19 Serum viscosity 1.8, , coags normal, b2 microglobulin 2.1, SPEP M spike in beta region 0.63 g/dl, 2 spikes in gamma region 1.23 g/dl and 1.14 g/dl IgG kappa and IgM kappa, UPEP +free kappa light chains and monoclonal IgG kappa    2/28/18 Hg 13.1 WBC 5.2   1/26/16 free kappa 23.3 ratio 28 viscosity 3.4, two M spikes 1g/dl and 0.9 g/dl IgM 2300  10/14/14 IgM 2496 SPEP M spike 2.2 g/dl    Path: none        Past Medical History:   Diagnosis Date    Glaucoma     Lupus     Personal history of colonic polyps 10/29/2019    Thyroid disease        Past Surgical History:   Procedure Laterality Date    BILATERAL TUBAL LIGATION      BREAST SURGERY  1986    Left breast ( cyst)    BUNIONECTOMY      CATARACT  EXTRACTION, BILATERAL Bilateral 08/2024    CHOLECYSTECTOMY  October,2022    COLONOSCOPY W/ BIOPSIES AND POLYPECTOMY  10/29/2019    TUBAL LIGATION  1993       Family History   Problem Relation Name Age of Onset    Prostate cancer Father Victor Hugo Morris     Cancer Father Victor Hugo Morris     Breast cancer Sister Jayshree Reid     Cancer Sister Jayshree Reid     Heart disease Brother Yury Morris     Cancer Brother Yury oMrris     Prostate cancer Brother      Arthritis Mother Sherrell Morris        Social History     Socioeconomic History    Marital status:    Tobacco Use    Smoking status: Never    Smokeless tobacco: Never   Substance and Sexual Activity    Alcohol use: Never    Drug use: Never    Sexual activity: Yes     Partners: Male     Birth control/protection: None     Social Drivers of Health     Financial Resource Strain: Low Risk  (6/4/2024)    Overall Financial Resource Strain (CARDIA)     Difficulty of Paying Living Expenses: Not very hard   Food Insecurity: No Food Insecurity (6/4/2024)    Hunger Vital Sign     Worried About Running Out of Food in the Last Year: Never true     Ran Out of Food in the Last Year: Never true   Physical Activity: Inactive (6/4/2024)    Exercise Vital Sign     Days of Exercise per Week: 1 day     Minutes of Exercise per Session: 0 min   Stress: No Stress Concern Present (6/4/2024)    Citizen of [place] of Occupational [employer] - Occupational Stress Questionnaire     Feeling of Stress : Not at all   Housing Stability: Unknown (6/4/2024)    Housing Stability Vital Sign     Unable to Pay for Housing in the Last Year: No       Current Outpatient Medications   Medication Sig Dispense Refill    folic acid (FOLVITE) 1 MG tablet Take 1 tablet by mouth once daily.      latanoprost 0.005 % ophthalmic solution latanoprost 0.005 % eye drops      levothyroxine (SYNTHROID) 125 MCG tablet Take 1 tablet by mouth once daily.      prednisoLONE acetate (PRED FORTE) 1 % DrpS Place 2 drops into  the right eye. (Patient not taking: Reported on 11/15/2024)      rosuvastatin (CRESTOR) 40 MG Tab 1 tablet.       No current facility-administered medications for this visit.       Review of patient's allergies indicates:   Allergen Reactions    Hydrocodone      Other reaction(s): .  Other reaction(s): irritability    Promethazine Other (See Comments)     Other reaction(s): jittery       Review of Systems     CONSTITUTIONAL: no fevers, no chills, no unintentional weight loss, + fatigue, no weakness  HEMATOLOGIC: no abnormal bleeding, no abnormal bruising, no drenching night sweats  ONCOLOGIC: no new masses or lumps  HEENT: no vision loss or vision changes, no tinnitus or hearing loss, no nose bleeding, no dysphagia, no odynophagia  CVS: no chest pain, no palpitations, no dyspnea on exertion  RESP: no shortness of breath, no hemoptysis, no cough  BREAST: no nipple discharge, no breast tenderness, no breast masses on self breast examination  GI: no nausea, no vomiting, no diarrhea, no constipation, no melena, no hematochezia, no hematemesis, no abdominal pain, no increase in abdominal girth  : no dysuria, no hematuria, no discharge  GYN: no abnormal vaginal bleeding, no dyspareunia, no vaginal discharge  INTEGUMENT: no rashes, no abnormal bruising, no nail pitting, no hyperpigmentation  NEURO: no falls, no memory loss, no paresthesias or dysesthesias, no urofecal incontinence or retention, no loss of strength on any extremity  MSK: + back pain, no new joint pains but +pelvic pains, no joint swelling  PSYCH: no suicidal or homicidal ideation, no depression, no insomnia     There were no vitals filed for this visit.        Assessment/Plan     # Waldenstrom macroglobulinemia, probable  She has biclonal IgM M spike and another IgG M spike, all kappa. Her serum viscosity is essentially normal. She has minimal to mild lymphadenopathy, without organomegaly, symptoms or any cytopenias. She has never had a bone marrow biopsy  or a LN biopsy (she refuses). As no tissue is available, she does not meet criteria for Waldenstrom's or IgM Myeloma or Amyloidosis or LPL or any other lymphoma for that matter.   She still does not want to do bone marrow biopsy despite extensive discussions.  We will continue to pursue this in the future.  CT C/A/P on 11/19/19 showed Slight increase of right axillary lymph nodes, the largest of which measures 8mm. Stable left axillary lymph nodes. Slight decrease in size of bilateral inguinal lymph nodes which remain normal by size criteria. No other significant changes. Imaging only as clinically indicated.      Plan.    Reviewed labs, noted stable IgM and stable M spike   Patient was unable to get Bone marrow biopsy done in [place]  Plan to follow-up in 3 months, labs the week prior.      Lindsey Springer FNP-C

## 2025-02-28 ENCOUNTER — OFFICE VISIT (OUTPATIENT)
Dept: HEMATOLOGY/ONCOLOGY | Facility: CLINIC | Age: 60
End: 2025-02-28
Payer: MEDICARE

## 2025-02-28 VITALS
TEMPERATURE: 98 F | HEART RATE: 77 BPM | OXYGEN SATURATION: 98 % | WEIGHT: 195.5 LBS | HEIGHT: 67 IN | BODY MASS INDEX: 30.69 KG/M2 | DIASTOLIC BLOOD PRESSURE: 76 MMHG | RESPIRATION RATE: 14 BRPM | SYSTOLIC BLOOD PRESSURE: 136 MMHG

## 2025-02-28 DIAGNOSIS — D47.2 MONOCLONAL PARAPROTEINEMIA: ICD-10-CM

## 2025-02-28 DIAGNOSIS — M32.9 SYSTEMIC LUPUS ERYTHEMATOSUS, UNSPECIFIED SLE TYPE, UNSPECIFIED ORGAN INVOLVEMENT STATUS: ICD-10-CM

## 2025-02-28 DIAGNOSIS — C88.00 WALDENSTROM'S MACROGLOBULINEMIA: Primary | ICD-10-CM

## 2025-02-28 DIAGNOSIS — D89.2 HYPERGAMMAGLOBULINEMIA: ICD-10-CM

## 2025-05-30 ENCOUNTER — OFFICE VISIT (OUTPATIENT)
Dept: HEMATOLOGY/ONCOLOGY | Facility: CLINIC | Age: 60
End: 2025-05-30
Payer: MEDICARE

## 2025-05-30 VITALS
HEIGHT: 67 IN | HEART RATE: 64 BPM | WEIGHT: 195.88 LBS | RESPIRATION RATE: 18 BRPM | SYSTOLIC BLOOD PRESSURE: 117 MMHG | OXYGEN SATURATION: 98 % | DIASTOLIC BLOOD PRESSURE: 75 MMHG | BODY MASS INDEX: 30.74 KG/M2 | TEMPERATURE: 98 F

## 2025-05-30 DIAGNOSIS — D89.2 HYPERGAMMAGLOBULINEMIA: ICD-10-CM

## 2025-05-30 DIAGNOSIS — M32.9 SYSTEMIC LUPUS ERYTHEMATOSUS, UNSPECIFIED SLE TYPE, UNSPECIFIED ORGAN INVOLVEMENT STATUS: ICD-10-CM

## 2025-05-30 DIAGNOSIS — D47.2 MONOCLONAL PARAPROTEINEMIA: ICD-10-CM

## 2025-05-30 DIAGNOSIS — C88.00 WALDENSTROM'S MACROGLOBULINEMIA: Primary | ICD-10-CM

## 2025-05-30 NOTE — PROGRESS NOTES
Chief complaint:   Results    HPI: 57 y/o F w/ PMHx of SLE, glaucoma, hypothyroidism, HLD, GERD, OA and Waldenstrom Macroglobulinemia previously being followed at [place] by Dr Massey presented to [facility] to establish care.    She has never had a bone marrow biopsy or a lymph node biopsy. Diagnosed with SLE 17 years ago. Presenting symptoms were neuropathy. Diagnosed with WM , never had treatment    Colonoscopy , as per pt negative, Dr Boland. Follow up in 3 years    Interval History  Today, 25, patient denies any acute concerns today. She denies any decreased appetite, weight loss, easy bruising, bleeding, burning pain in extremities, headaches, vision changes, shortness of breath, chest tightness, dizziness    Imagin24 US Left breast No suspicious abnormalities, small benign appearing nodules  2023 skeletal survey:  No suspicious lytic or sclerotic lesions.  Degenerative changes within the spine and bilateral knees.  21 screening MMG: BIRADS2  20 Screening MMG: BIRADS1  19: CT C/A/P: Slight increase of right axillary lymph nodes, the largest of which measures 8mm. Stable left axillary lymph nodes. Slight decrease in size of bilateral inguinal lymph nodes which remain normal by size criteria. No other significant changes.  2/15/19 CT C/A/P w/ IV contrast: borderline enlarged left axillary LN, slight increase in b/l inguinal LNs  18 CT C/A/P w/ IV contrast: small b/l axillary nodes, small borderline nodes in inguinal/pelvic areas  4/2/15 CT A/P w/ IV contrast: previously seen multiple prominent but non enlarged LNs along path of splenic artery and throughout splenic hilum have decreased in size and number since last exam. Previously seen mildly enlarged LN in between upper pole of left kidney and spleen measuring at least exam 1.7x1.4cm significantly decreased in size now measuring 0.8x0.7cm    Labs:   25 CMP unremarkable, CBC unremarkable, kappa light chain  279.4, lambda light chain 7.4, light chain ratio 37.76, IgG 2130, IgA 39, IgM 961, M spike measuring 1.3 and 0.2 grams/deciliter, immunofixation shows biclonal IgG protein with kappa specificity And IgM with kappa specificity.  Serum viscosity 1.7    11/01/2024 CMP unremarkable, CBC unremarkable, kappa light chain 274.6, lambda light chain 5.0, light chain ratio 54.92, IgG 2071, IgA 52, IgM 1048, M spike measuring 1.4 and 0.2 grams/deciliter, immunofixation shows biclonal IgG protein with kappa specificity And IgM with kappa specificity.  Serum viscosity NR    08/06/2024 CMP unremarkable, CBC unremarkable, kappa light chain 292.1, lambda light chain 6.0, light chain ratio 48.6, IgG 2175, IgA 41, IgM 1052, M spike measuring 1.4 and 0.3 grams/deciliter, immunofixation shows biclonal IgG protein with kappa specificity And IgM with kappa specificity.  Serum viscosity 1.9.    4/24/24 CBC and CMP unremarkable, Free k light chains 250.9, FLLC 7.0, K/L ratio 35.84, IgG 1983, IgA 43, IgM 1141, gamma globulin 2.3, M spike gamma 1.2 and 0.3, serum viscosity 2.0    1/30/24 CMP unremarkable, CBC unremarkable, , serum viscosity 1.9, IgG 1859, IgA 48, IgM 1224, Gamma globulin 2.1, 2 M spikes gamma measuring 1.2, 0.3 total 4.1, A/G ratio .9, Free Kappa Light chains, 246.3, Free lambda light chains 6.7, K/L ratio 36.76    10/30/2023 CMP unremarkable, , WBC count 3.04, hemoglobin 13.6, MCV 89, platelet count 207, Beta-2 microglobulin 3.2, IgG 1602, IgA 57, IgM 1235, 2 M spikes measuring 1.2 grams/deciliter and 0.3 grams/deciliter, immunofixation shows biclonal IgG kappa protein.  Kappa light chain 244.3, lambda light chain 9.1, kappa to lambda ratio 26.8, serum viscosity 1.8.    07/21/2023 creatinine 0.7, albumin 3.5, calcium 9.0, LFTs within normal limits, ,WBC count 3.82, hemoglobin 13.2, MCV 88.5, platelet count 238, ANC 2.07, Beta-2 microglobulin 2.5, kappa light chain 259, lambda light chain 7, light chain  ratio 37.10, urine immunofixation with IgG monoclonal protein kappa light chain, urine kappa to lambda ratio at 12.08, 2 M spikes measuring 1.4 and 0.3, IgM without no confirmed light chain association.      05/01/23: CMP unremarkable, creatinine 0.8, calcium 9.3, WBC count 3.81, hemoglobin 14.2, MCV 89.4, platelet count 241, ANC 2.02, Beta-2 microglobulin 2.6, kappa light chain  253.79, lambda light chain 8.10, light chain ratio 31.33, serum viscosity 1.29,  IgG 1675, IgM 993, IgA 60, SPEP/Pippa  with m spike of 0.42 grams/deciliter in beta region, Pippa with IgM kappa and IgG kappa biclonal spike. Upep positive for bence talbert proteins. PIPPA with monoclonal IgG heavy chain associated [place] light chain and excess monoclonal free kappa light chain. , UKappa LC 91.47, ULambda LC 4.73    02/01/2023:  CMP unremarkable, creatinine 0.8, calcium 9.3, WBC count 4.2, hemoglobin 13.6, MCV 90.1, platelet count 224, ANC 2.12, Beta-2 microglobulin 2.7, kappa light chain  240.5, lambda light chain 7.9, light chain ratio 30.21, serum viscosity 1.31.  SPEP/Pippa he with mm spike of 0.43 grams/deciliter, Pippa he with IgM kappa and IgG kappa biclonal spike.    12/20/2022:  Creatinine 0.75, albumin 3.6, calcium 9.1, alkaline phosphatase 68, total bili 1.1, AST 23, ALT 13, , folic acid 10.8, WBC count 3.2, hemoglobin 13.8, platelet count 233, ANC 1.63, beta 2 microglobulin 2.6, kappa free light chain 195.9, lambda free light chain 6.0, light chain ratio 32.65, serum viscosity 1.34, SPEP/Pippa he with 1.56 grams/deciliter and 0.5 grams/deciliter of monoclonal protein, with IgM type kappa and IgG type kappa biclonal protein.    09/26/22: CBC with WBC of 3.66 and ANC 1900, otherwise good. . Uric acid 7.3 (high). Kappa .62, Lambda LC 9.76, K/L ratio 23.12. Spep with M-spikes in the gamma region - 1.34 g/dL and 0.48 g/dL of the total 1.90 g/dL of the protein in the gamma region. Upep with faint band of IgG, Kappa LC 43.66, Lambda  LC 3.41. Serum viscosity pending.     06/27/2022:  Creatinine 0.76, albumin 3.6, calcium 9.6, , folic acid 9.2, vitamin B12 538, WBC count 3.57, hemoglobin 13.6, MCV 88.9, platelet count 237, INR 1.1, kappa free light chain to 31.86, lambda free light chain 8.93, kappa to lambda free light chain ratio 25.96, IgG 1529, SPEP/Pippa the with the M spike measuring 0.46 grams/deciliter in the beta region.  There is another M spike in the gamma region.  Collective monoclonal M spike measuring 2.05 grams/deciliter, immunofixation shows a palmar rising IgG type kappa and a palmar rising IgM type kappa biclonal protein.  UPEP is positive for monoclonal free kappa light chain, serum viscosity 1.48,    3/16/22 Cr 0.75, Alb 3.4, TP 8.2, , TSH 0.79, folic acid 12.27, WBC 4.05, Hgb 14.1, , ANC 2.31, PT 12.0, PTT 31, INR 1.0, IgG 1682, IgM 1266, IgA 63, SFLC ratio 28.90, kappa 215.63, lambda 7.46, collect M spike 2.13 g/dL IgG kappa and IgM kappa, UPEP  mg/day, Positive Bence Garcia protein. Urine PIPPA shows IgG heavy chain with associated kappa light chain excess monoclonal free kappa light chains, serum viscosity 1.46    1/5/22 Cr 0.78, Alb 3.5, TP 8.3, , TSH 11.3, folic acid 6.88, WBC 3.43, Hgb 13.9, , ANC 1.31, PT 12.1, INR 1.0, PTT 28, SFLC ratio 31.97, kappa 305.30, lambda 9.55, collect M spike 2.18 g/dL IgG kappa and IgM kappa    10/18/21 Cr 0.74 Alb 3.4 TP 8.2  TSH 0.22 Folate 9.19 WBC 4.23 Hg 13.6  INR 1.1, SFLC ratio 25.83, kappa 221, lambda 8.58 serum viscosity 1.67, collective m spike 2.28 IgG kappa and IgM kappa    7/12/21 serum viscosity 1.79 SPEP w/ PIPPA IgG 1946 IgA 57 IgM 1289 Abs kappa 229.66 Abs lambda 9.43 SFLC 24.35 1.32 g/dl, 0.96 g/dl IgG kappa IgM kappa 24hr UPEP 142 mg/day monoclonal free kappa and PIPPA with IgG heavy chain    4/19/21 serum viscosity 1.62 SPEP w/ PIPPA Igg 2115 IgM 1558 IgA 62 Abs kappa 273.9 Abs lambda 7.47 SFLC ratio 36.67 M spikes 2.55 g/dl  biclonal IgG kappa and IgM kappa with additional band in IgM kappa INR 1.1 PT 12.8 PTT 28 Cr 0.75 Alb 3.4 TP 8.7  TSH 0.1031 folate 11.96 WBC 3.48 Hg 14  24hr Urine protein <156 mg/day    2/22/21 serum viscosity 1.75 SPEP w/ PIPPA IgG 1947 IgM 1250 IgA 56 Abs Kappa 219 Abs lambda 7.65 SFLC ratio 28.63 M spike 0.46 g/dl beta region, biclonal M spikes gamma region 1.31 g/dl and 1.05 g/dl IgG kappa and IgM kappa with additional faint band in IgG kappa, 24hr UPEP 168 mg/tera positive for Bence Garcia free kappa with PIPPA showing IgG kappa, excess free kappa and IgM kappa  Cr 0.70, Alb 3.3 TP 8.4 Ca 9.0  TSH 0.03 Folic Acid 6.09 WBC 3.87, Hgb 13.7, , ANC 2.01    11/26/20 Serum viscosity 1.51 SPEP w/ PIPPA IgG 1780 IgM 1620 IgA 66 Abs kappa 241.49 Abs lambda 7.67 SFLC ratio 31.49 M spikes 1.17 g/dl and 1.01 g/dl IgG kappa and IgM kappa with additional faint band in IgG kappa, 24hr UPEP Protein 172mg/day, Bence Garcia positive monoclonal free kappa, PIPPA with monoclonal IgG heavy chain with associated kappa light chain and excess free kappa light chains  Cr 0.79 TP 8.6 Alb 3.4 Ca 9.5 AlkPhos 68 AST 19 ALT 14  TSH 0.0498 WBC 5.11 Hg 13.4 RBC 4.66 MCV 91.2 RDW 12.9  ANC 2.9 INR 1.1 PT 12.7 PTT 28    9/29/20 Total protein in UPEP 138mg/day, positive for Bence Garcia free kappa. PIPPA IgG monoclonal heavy chain with associated kappa light chain. SPEP M spike 0.49 g/dl in beta region, M spikes in gamma region collectively account for 2.15 g/dl, PIPPA shows IgG type kappa and polymerizing IgM type kappa biclonal proteins with an additional faint band in IgG kappa, Serum viscosity 1.70. Abs kappa 194 Abs lambda 6.51 SFLC ratio 29.93. INR 1, PT 11.9, PTT 30. Cr 0.77 TP 8.2 Alb 3.4 Ca 8.  WBC 3.33 Hg 13.7     6/2/20 Total protein in UPEP 185mg/day, positive for Bence Garcia free kappa. PIPPA IgG monoclonal heavy chain with associated kappa light chain. SPEP M spike 0.46 g/dl in beta region, M  spikes in gamma region 2.15g/dl and 1.03 g/dl IgG kappa and polymerizing IgM kappa with additional faint band in IgG kappa. Serum viscosity 1.78. Abs kappa 185 Abs lambda 7.73 SFLC ratio 23.9. INR 1, PT 11.6. Cr 0.84 TP 8.4 Alb 3.5 Ca 9.01  WBC 3.62 Hg 13.3     2/13/20 SPEP w/ PIPPA IgA 82 IgM 1850 IgG 1680 M spike in beta region 0.52g/dl. M spikes in gamma region 1.08 g/dl and 1.04 g/dl IgG kappa and IgM kappa additional faint band IgG kappa serum viscosity 1.63  11/11/19 SFLC ratio 31.67, kappa 17.10, lambda 0.54, PIPPA shows IgM kappa and IgG kappa biclonal proteins serum viscosity 1.78    8/15/19 SIEP IgA 78, IgM 2310 (was 2430), IgG 1700 (was 1660, serum viscosity 1.81 ( was 1.69), CMP WNL except TP 8.8, WBC 3.14, HGB 13,7, HCT 41.9, MCV 89 PLT 211K, SKFLC 31/LFCL 0.77, K:L ratio 41  5/16/19: CBC and CMP unremarkable, coags WNL;     2/18/19 CBC and CMP WNL, LDH WNL, b2 microglobulin 2.5, serum viscosity 169, SPEP w/ M spikes in the gamma region 1.21 g/dl and 1.13 g/dl, suspicious broadening of the complement in the beta region. PIPPA pattern shows IgG type kappa and IgM type kappa biclonal proteins with an additional band in IgM kappa.    11/12/18 Hg 13.3 Kappa 10.8 lambda 0.3 SLFC ratio 29.19 Serum viscosity 1.8, , coags normal, b2 microglobulin 2.1, SPEP M spike in beta region 0.63 g/dl, 2 spikes in gamma region 1.23 g/dl and 1.14 g/dl IgG kappa and IgM kappa, UPEP +free kappa light chains and monoclonal IgG kappa    2/28/18 Hg 13.1 WBC 5.2   1/26/16 free kappa 23.3 ratio 28 viscosity 3.4, two M spikes 1g/dl and 0.9 g/dl IgM 2300  10/14/14 IgM 2496 SPEP M spike 2.2 g/dl    Path: none        Past Medical History:   Diagnosis Date    Glaucoma     Lupus     Personal history of colonic polyps 10/29/2019    Thyroid disease        Past Surgical History:   Procedure Laterality Date    BILATERAL TUBAL LIGATION      BREAST SURGERY  1986    Left breast ( cyst)    BUNIONECTOMY      CATARACT  EXTRACTION, BILATERAL Bilateral 08/2024    CHOLECYSTECTOMY  October,2022    COLONOSCOPY W/ BIOPSIES AND POLYPECTOMY  10/29/2019    TUBAL LIGATION  1993       Family History   Problem Relation Name Age of Onset    Prostate cancer Father Victor Hugo Morris     Cancer Father Victor Hugo Morris     Breast cancer Sister Jayshree Reid     Cancer Sister Jayshree Reid     Heart disease Brother Yury Morris     Cancer Brother Yury Morris     Prostate cancer Brother      Arthritis Mother Sherrell Morris        Social History     Socioeconomic History    Marital status:    Tobacco Use    Smoking status: Never    Smokeless tobacco: Never   Substance and Sexual Activity    Alcohol use: Never    Drug use: Never    Sexual activity: Yes     Partners: Male     Birth control/protection: None     Social Drivers of Health     Financial Resource Strain: Low Risk  (6/4/2024)    Overall Financial Resource Strain (CARDIA)     Difficulty of Paying Living Expenses: Not very hard   Food Insecurity: No Food Insecurity (6/4/2024)    Hunger Vital Sign     Worried About Running Out of Food in the Last Year: Never true     Ran Out of Food in the Last Year: Never true   Physical Activity: Inactive (6/4/2024)    Exercise Vital Sign     Days of Exercise per Week: 1 day     Minutes of Exercise per Session: 0 min   Stress: No Stress Concern Present (6/4/2024)    [employer] [place] [employer] - Occupational Stress Questionnaire     Feeling of Stress : Not at all   Housing Stability: Unknown (6/4/2024)    Housing Stability Vital Sign     Unable to Pay for Housing in the Last Year: No       Current Outpatient Medications   Medication Sig Dispense Refill    folic acid (FOLVITE) 1 MG tablet Take 1 tablet by mouth once daily.      latanoprost 0.005 % ophthalmic solution latanoprost 0.005 % eye drops      levothyroxine (SYNTHROID) 125 MCG tablet Take 1 tablet by mouth once daily.      rosuvastatin (CRESTOR) 40 MG Tab 1 tablet.       No current  facility-administered medications for this visit.       Review of patient's allergies indicates:   Allergen Reactions    Hydrocodone      Other reaction(s): .  Other reaction(s): irritability    Promethazine Other (See Comments)     Other reaction(s): jittery       Review of Systems     CONSTITUTIONAL: no fevers, no chills, no unintentional weight loss, + fatigue, no weakness  HEMATOLOGIC: no abnormal bleeding, no abnormal bruising, no drenching night sweats  ONCOLOGIC: no new masses or lumps  HEENT: no vision loss or vision changes, no tinnitus or hearing loss, no nose bleeding, no dysphagia, no odynophagia  CVS: no chest pain, no palpitations, no dyspnea on exertion  RESP: no shortness of breath, no hemoptysis, no cough  BREAST: no nipple discharge, no breast tenderness, no breast masses on self breast examination  GI: no nausea, no vomiting, no diarrhea, no constipation, no melena, no hematochezia, no hematemesis, no abdominal pain, no increase in abdominal girth  : no dysuria, no hematuria, no discharge  GYN: no abnormal vaginal bleeding, no dyspareunia, no vaginal discharge  INTEGUMENT: no rashes, no abnormal bruising, no nail pitting, no hyperpigmentation  NEURO: no falls, no memory loss, no paresthesias or dysesthesias, no urofecal incontinence or retention, no loss of strength on any extremity  MSK: + back pain, no new joint pains but +pelvic pains, no joint swelling  PSYCH: no suicidal or homicidal ideation, no depression, no insomnia     Vitals:    05/30/25 1059   BP: 117/75   Pulse: 64   Resp: 18   Temp: 98.2 °F (36.8 °C)           Assessment/Plan     # Waldenstrom macroglobulinemia, probable  She has biclonal IgM M spike and another IgG M spike, all kappa. Her serum viscosity is essentially normal. She has minimal to mild lymphadenopathy, without organomegaly, symptoms or any cytopenias. She has never had a bone marrow biopsy or a LN biopsy (she refuses). As no tissue is available, she does not meet  criteria for Waldenstrom's or IgM Myeloma or Amyloidosis or LPL or any other lymphoma for that matter.   She still does not want to do bone marrow biopsy despite extensive discussions.  We will continue to pursue this in the future.  CT C/A/P on 11/19/19 showed Slight increase of right axillary lymph nodes, the largest of which measures 8mm. Stable left axillary lymph nodes. Slight decrease in size of bilateral inguinal lymph nodes which remain normal by size criteria. No other significant changes. Imaging only as clinically indicated.      Plan.    Reviewed labs, noted stable IgM and stable M spike   Patient was unable to get Bone marrow biopsy done in [place]  Plan to follow-up in 3 months, labs the week prior.      Lindsey Springer FNP-C

## 2025-09-04 ENCOUNTER — OFFICE VISIT (OUTPATIENT)
Dept: HEMATOLOGY/ONCOLOGY | Facility: CLINIC | Age: 60
End: 2025-09-04
Payer: MEDICARE

## 2025-09-04 VITALS
RESPIRATION RATE: 14 BRPM | WEIGHT: 189.31 LBS | HEART RATE: 55 BPM | DIASTOLIC BLOOD PRESSURE: 89 MMHG | SYSTOLIC BLOOD PRESSURE: 172 MMHG | OXYGEN SATURATION: 95 % | TEMPERATURE: 98 F | HEIGHT: 67 IN | BODY MASS INDEX: 29.71 KG/M2

## 2025-09-04 DIAGNOSIS — C88.00 WALDENSTROM'S MACROGLOBULINEMIA: ICD-10-CM

## 2025-09-04 DIAGNOSIS — D47.2 MONOCLONAL PARAPROTEINEMIA: ICD-10-CM

## 2025-09-04 DIAGNOSIS — D89.2 HYPERGAMMAGLOBULINEMIA: ICD-10-CM

## 2025-09-04 DIAGNOSIS — M32.9 SYSTEMIC LUPUS ERYTHEMATOSUS, UNSPECIFIED SLE TYPE, UNSPECIFIED ORGAN INVOLVEMENT STATUS: ICD-10-CM

## 2025-09-04 RX ORDER — CELECOXIB 200 MG/1
200 CAPSULE ORAL DAILY
COMMUNITY
Start: 2025-08-21

## 2025-09-04 RX ORDER — ROSUVASTATIN CALCIUM 20 MG/1
20 TABLET, COATED ORAL DAILY
COMMUNITY
Start: 2025-07-11